# Patient Record
Sex: MALE | Race: WHITE | NOT HISPANIC OR LATINO
[De-identification: names, ages, dates, MRNs, and addresses within clinical notes are randomized per-mention and may not be internally consistent; named-entity substitution may affect disease eponyms.]

---

## 2017-07-31 ENCOUNTER — LABORATORY RESULT (OUTPATIENT)
Age: 57
End: 2017-07-31

## 2017-07-31 ENCOUNTER — APPOINTMENT (OUTPATIENT)
Dept: FAMILY MEDICINE | Facility: CLINIC | Age: 57
End: 2017-07-31
Payer: COMMERCIAL

## 2017-07-31 VITALS
RESPIRATION RATE: 16 BRPM | HEART RATE: 62 BPM | SYSTOLIC BLOOD PRESSURE: 140 MMHG | BODY MASS INDEX: 28.63 KG/M2 | WEIGHT: 200 LBS | DIASTOLIC BLOOD PRESSURE: 90 MMHG | HEIGHT: 70 IN

## 2017-07-31 DIAGNOSIS — F15.90 OTHER STIMULANT USE, UNSPECIFIED, UNCOMPLICATED: ICD-10-CM

## 2017-07-31 DIAGNOSIS — Z78.9 OTHER SPECIFIED HEALTH STATUS: ICD-10-CM

## 2017-07-31 DIAGNOSIS — Z00.00 ENCOUNTER FOR GENERAL ADULT MEDICAL EXAMINATION W/OUT ABNORMAL FINDINGS: ICD-10-CM

## 2017-07-31 LAB
BILIRUB UR QL STRIP: NORMAL
CLARITY UR: CLEAR
COLLECTION METHOD: NORMAL
GLUCOSE UR-MCNC: NORMAL
HCG UR QL: 0.2 EU/DL
HGB UR QL STRIP.AUTO: NORMAL
KETONES UR-MCNC: NORMAL
LEUKOCYTE ESTERASE UR QL STRIP: NORMAL
NITRITE UR QL STRIP: NORMAL
PH UR STRIP: 5.5
PROT UR STRIP-MCNC: NORMAL
SP GR UR STRIP: 1.02

## 2017-07-31 PROCEDURE — 81003 URINALYSIS AUTO W/O SCOPE: CPT | Mod: QW

## 2017-07-31 PROCEDURE — 90472 IMMUNIZATION ADMIN EACH ADD: CPT

## 2017-07-31 PROCEDURE — 90471 IMMUNIZATION ADMIN: CPT

## 2017-07-31 PROCEDURE — 99386 PREV VISIT NEW AGE 40-64: CPT | Mod: 25

## 2017-07-31 PROCEDURE — 96127 BRIEF EMOTIONAL/BEHAV ASSMT: CPT

## 2017-07-31 PROCEDURE — 90715 TDAP VACCINE 7 YRS/> IM: CPT | Mod: LT

## 2017-07-31 PROCEDURE — 36415 COLL VENOUS BLD VENIPUNCTURE: CPT

## 2017-07-31 PROCEDURE — 90670 PCV13 VACCINE IM: CPT | Mod: RT

## 2017-07-31 PROCEDURE — 93000 ELECTROCARDIOGRAM COMPLETE: CPT

## 2017-07-31 RX ORDER — AZELASTINE HYDROCHLORIDE 137 UG/1
0.1 SPRAY, METERED NASAL
Qty: 30 | Refills: 0 | Status: DISCONTINUED | COMMUNITY
Start: 2017-05-08 | End: 2017-07-31

## 2017-07-31 RX ORDER — DOXYCYCLINE HYCLATE 100 MG/1
100 TABLET ORAL
Qty: 14 | Refills: 0 | Status: DISCONTINUED | COMMUNITY
Start: 2017-06-01 | End: 2017-07-31

## 2017-07-31 RX ORDER — NEOMYCIN AND POLYMYXIN B SULFATES AND HYDROCORTISONE OTIC 10; 3.5; 1 MG/ML; MG/ML; [USP'U]/ML
3.5-10000-1 SUSPENSION AURICULAR (OTIC)
Qty: 10 | Refills: 0 | Status: DISCONTINUED | COMMUNITY
Start: 2017-05-08 | End: 2017-07-31

## 2017-08-29 ENCOUNTER — TRANSCRIPTION ENCOUNTER (OUTPATIENT)
Age: 57
End: 2017-08-29

## 2017-09-19 ENCOUNTER — INPATIENT (INPATIENT)
Facility: HOSPITAL | Age: 57
LOS: 2 days | Discharge: HOME | DRG: 125 | End: 2017-09-22
Attending: FAMILY MEDICINE | Admitting: HOSPITALIST
Payer: COMMERCIAL

## 2017-09-19 VITALS
RESPIRATION RATE: 18 BRPM | WEIGHT: 199.96 LBS | HEART RATE: 57 BPM | DIASTOLIC BLOOD PRESSURE: 89 MMHG | HEIGHT: 70 IN | TEMPERATURE: 99 F | SYSTOLIC BLOOD PRESSURE: 147 MMHG | OXYGEN SATURATION: 100 %

## 2017-09-19 DIAGNOSIS — E03.9 HYPOTHYROIDISM, UNSPECIFIED: ICD-10-CM

## 2017-09-19 DIAGNOSIS — R00.1 BRADYCARDIA, UNSPECIFIED: ICD-10-CM

## 2017-09-19 DIAGNOSIS — Z90.49 ACQUIRED ABSENCE OF OTHER SPECIFIED PARTS OF DIGESTIVE TRACT: Chronic | ICD-10-CM

## 2017-09-19 DIAGNOSIS — H46.9 UNSPECIFIED OPTIC NEURITIS: ICD-10-CM

## 2017-09-19 DIAGNOSIS — H53.8 OTHER VISUAL DISTURBANCES: ICD-10-CM

## 2017-09-19 LAB
ALBUMIN SERPL ELPH-MCNC: 4.7 G/DL — SIGNIFICANT CHANGE UP (ref 3.3–5.2)
ALP SERPL-CCNC: 80 U/L — SIGNIFICANT CHANGE UP (ref 40–120)
ALT FLD-CCNC: 13 U/L — SIGNIFICANT CHANGE UP
ANION GAP SERPL CALC-SCNC: 14 MMOL/L — SIGNIFICANT CHANGE UP (ref 5–17)
APTT BLD: 30.6 SEC — SIGNIFICANT CHANGE UP (ref 27.5–37.4)
AST SERPL-CCNC: 18 U/L — SIGNIFICANT CHANGE UP
BASOPHILS # BLD AUTO: 0 K/UL — SIGNIFICANT CHANGE UP (ref 0–0.2)
BASOPHILS NFR BLD AUTO: 0.5 % — SIGNIFICANT CHANGE UP (ref 0–2)
BILIRUB SERPL-MCNC: 0.8 MG/DL — SIGNIFICANT CHANGE UP (ref 0.4–2)
BUN SERPL-MCNC: 14 MG/DL — SIGNIFICANT CHANGE UP (ref 8–20)
CALCIUM SERPL-MCNC: 9.2 MG/DL — SIGNIFICANT CHANGE UP (ref 8.6–10.2)
CHLORIDE SERPL-SCNC: 104 MMOL/L — SIGNIFICANT CHANGE UP (ref 98–107)
CO2 SERPL-SCNC: 26 MMOL/L — SIGNIFICANT CHANGE UP (ref 22–29)
CREAT SERPL-MCNC: 1.17 MG/DL — SIGNIFICANT CHANGE UP (ref 0.5–1.3)
EOSINOPHIL # BLD AUTO: 0.1 K/UL — SIGNIFICANT CHANGE UP (ref 0–0.5)
EOSINOPHIL NFR BLD AUTO: 1.4 % — SIGNIFICANT CHANGE UP (ref 0–5)
ERYTHROCYTE [SEDIMENTATION RATE] IN BLOOD: 2 MM/HR — SIGNIFICANT CHANGE UP (ref 0–20)
GLUCOSE SERPL-MCNC: 87 MG/DL — SIGNIFICANT CHANGE UP (ref 70–115)
HCT VFR BLD CALC: 42.6 % — SIGNIFICANT CHANGE UP (ref 42–52)
HGB BLD-MCNC: 14.3 G/DL — SIGNIFICANT CHANGE UP (ref 14–18)
INR BLD: 1.04 RATIO — SIGNIFICANT CHANGE UP (ref 0.88–1.16)
LYMPHOCYTES # BLD AUTO: 1.5 K/UL — SIGNIFICANT CHANGE UP (ref 1–4.8)
LYMPHOCYTES # BLD AUTO: 23.3 % — SIGNIFICANT CHANGE UP (ref 20–55)
MCHC RBC-ENTMCNC: 27.4 PG — SIGNIFICANT CHANGE UP (ref 27–31)
MCHC RBC-ENTMCNC: 33.6 G/DL — SIGNIFICANT CHANGE UP (ref 32–36)
MCV RBC AUTO: 81.6 FL — SIGNIFICANT CHANGE UP (ref 80–94)
MONOCYTES # BLD AUTO: 0.6 K/UL — SIGNIFICANT CHANGE UP (ref 0–0.8)
MONOCYTES NFR BLD AUTO: 8.6 % — SIGNIFICANT CHANGE UP (ref 3–10)
NEUTROPHILS # BLD AUTO: 4.3 K/UL — SIGNIFICANT CHANGE UP (ref 1.8–8)
NEUTROPHILS NFR BLD AUTO: 65.9 % — SIGNIFICANT CHANGE UP (ref 37–73)
PLATELET # BLD AUTO: 249 K/UL — SIGNIFICANT CHANGE UP (ref 150–400)
POTASSIUM SERPL-MCNC: 4.1 MMOL/L — SIGNIFICANT CHANGE UP (ref 3.5–5.3)
POTASSIUM SERPL-SCNC: 4.1 MMOL/L — SIGNIFICANT CHANGE UP (ref 3.5–5.3)
PROT SERPL-MCNC: 7.5 G/DL — SIGNIFICANT CHANGE UP (ref 6.6–8.7)
PROTHROM AB SERPL-ACNC: 11.5 SEC — SIGNIFICANT CHANGE UP (ref 9.8–12.7)
RBC # BLD: 5.22 M/UL — SIGNIFICANT CHANGE UP (ref 4.6–6.2)
RBC # FLD: 13.2 % — SIGNIFICANT CHANGE UP (ref 11–15.6)
SODIUM SERPL-SCNC: 144 MMOL/L — SIGNIFICANT CHANGE UP (ref 135–145)
WBC # BLD: 6.5 K/UL — SIGNIFICANT CHANGE UP (ref 4.8–10.8)
WBC # FLD AUTO: 6.5 K/UL — SIGNIFICANT CHANGE UP (ref 4.8–10.8)

## 2017-09-19 PROCEDURE — 99285 EMERGENCY DEPT VISIT HI MDM: CPT

## 2017-09-19 PROCEDURE — 70553 MRI BRAIN STEM W/O & W/DYE: CPT | Mod: 26

## 2017-09-19 PROCEDURE — 99223 1ST HOSP IP/OBS HIGH 75: CPT

## 2017-09-19 PROCEDURE — 70548 MR ANGIOGRAPHY NECK W/DYE: CPT | Mod: 26

## 2017-09-19 RX ORDER — HYDROCORTISONE 20 MG
1000 TABLET ORAL ONCE
Qty: 0 | Refills: 0 | Status: DISCONTINUED | OUTPATIENT
Start: 2017-09-19 | End: 2017-09-19

## 2017-09-19 RX ORDER — ATORVASTATIN CALCIUM 80 MG/1
1 TABLET, FILM COATED ORAL
Qty: 0 | Refills: 0 | COMMUNITY

## 2017-09-19 RX ORDER — ATORVASTATIN CALCIUM 80 MG/1
20 TABLET, FILM COATED ORAL AT BEDTIME
Qty: 0 | Refills: 0 | Status: DISCONTINUED | OUTPATIENT
Start: 2017-09-19 | End: 2017-09-22

## 2017-09-19 RX ORDER — LEVOTHYROXINE SODIUM 125 MCG
1 TABLET ORAL
Qty: 0 | Refills: 0 | COMMUNITY

## 2017-09-19 RX ORDER — LEVOTHYROXINE SODIUM 125 MCG
75 TABLET ORAL DAILY
Qty: 0 | Refills: 0 | Status: DISCONTINUED | OUTPATIENT
Start: 2017-09-19 | End: 2017-09-22

## 2017-09-19 RX ORDER — ASPIRIN/CALCIUM CARB/MAGNESIUM 324 MG
1 TABLET ORAL
Qty: 0 | Refills: 0 | COMMUNITY

## 2017-09-19 RX ADMIN — ATORVASTATIN CALCIUM 20 MILLIGRAM(S): 80 TABLET, FILM COATED ORAL at 22:04

## 2017-09-19 RX ADMIN — Medication 58 MILLIGRAM(S): at 18:53

## 2017-09-19 NOTE — H&P ADULT - PROBLEM SELECTOR PLAN 1
Optic neuritis ? ER physician spoke to neurologist dr. De La O and pt. is given 1 gram of iv solumedrol. pt. will be followed , further solumedrol as per neurologist.  Dr. Angel pcp will follow. MRI , MRA studies to follow.

## 2017-09-19 NOTE — H&P ADULT - HISTORY OF PRESENT ILLNESS
58 y/o male who started having rt. eye blurry vision , peripheral loss of vision but clear in center without pain started about 4 days ago and was seen by ophthalmologist on the day of ER visit who sent him to ER . Pt. was told that his optic nerve was swollen and he will need more imaging. pt. never had these kind of sympt. before. no HA. no vision changes in left eye. no n / v/. no fall, no dizziness. no speech / swallow difficulty. no motor weakness in any parts of his body. no cp. no sob. no recent travel.

## 2017-09-19 NOTE — H&P ADULT - PROBLEM SELECTOR PLAN 2
will continue with his synthroid. check TFT. will continue with his synthroid. check TFT. hepatitis panel to be followed by pcp.

## 2017-09-19 NOTE — ED PROVIDER NOTE - ATTENDING CONTRIBUTION TO CARE
I, Anoop Fu, performed face to face bedside interview with this patient regarding history of present illness, review of symptoms and relevant past medical, social and family history.  I completed an independent physical examination.      Patient with right optic nerve swelling, likely secondary to optic neuritis, discussed with neuro and will see in am and want MRI and lab testing, will admit for further eval.

## 2017-09-19 NOTE — ED ADULT TRIAGE NOTE - CHIEF COMPLAINT QUOTE
'I was sent here from Dr Mccray's office for a swollen Optic nerve to see a neurologist, I had a loss of peripheral vision, I wear contacts but haven't been wearing them to see if there would be an improvement but didn't help. " Pt is has photophobia and is wearing sunglasses.

## 2017-09-19 NOTE — H&P ADULT - PROBLEM SELECTOR PLAN 3
pt's HR on ekg is 46, no cp. no dizziness. not on b.blk, hypothyroidism related ? will check ck/trop, echo and cardiologist  [facility] consult will be requested. pt's HR on ekg is 46, no cp. no dizziness. not on b.blk, hypothyroidism related ? will check ck/trop, echo and repeat ekg in am and will request dr. Angel to follow am ekg and cardiology consult as needed. on monitor HR is around 62.

## 2017-09-19 NOTE — ED PROVIDER NOTE - MEDICAL DECISION MAKING DETAILS
R eye optic neuritis likely due to MS. no e/o globe rupture or acute angle glaucoma or cva or GCA on h&p.  -neuro dr fung consulted & rec 1g steroids -labs -ekg -mri -admit

## 2017-09-19 NOTE — ED STATDOCS - PROGRESS NOTE DETAILS
58 yo M sent to ED from opthalmology Dr. Mccray's c/o loss of peripheral vision from right head. Denies n/v, headache, and weight loss. Pt states he was sent to ED to see a neurologist for swollen optic nerve. PE: Poor peripheral vision out of right eye.

## 2017-09-19 NOTE — H&P ADULT - NEUROLOGICAL DETAILS
sensation intact/deep reflexes intact/pt. has rt. visual field deficit in both upper and outer quadrants.  left visual filed intact./alert and oriented x 3

## 2017-09-19 NOTE — ED PROVIDER NOTE - OBJECTIVE STATEMENT
58yo PMH hypothyroidism, HLD, diverticulitis s/p parital colectomy, contact lens user p/w R eye vision change x 4days. pt has had blurry vision & loss of peripheral vision in R eye. denies any eye pain, ha, neck pain, jaw pain, f/c, ivdu, cp, sob. pt went to see opthalmology dr epstein who was concerned about "swollen nerve" & sent pt for neurology evaluation 58yo PMH hypothyroidism, HLD, diverticulitis s/p parital colectomy, contact lens user p/w R eye vision change x 4days. pt has had blurry vision & loss of peripheral vision in R eye. denies any eye pain, ha, neck pain, jaw pain, f/c, ivdu, cp, sob. pt went to see opthalmology dr epstein [phone] who was concerned about "swollen nerve" & sent pt for neurology evaluation & imaging

## 2017-09-20 ENCOUNTER — TRANSCRIPTION ENCOUNTER (OUTPATIENT)
Age: 57
End: 2017-09-20

## 2017-09-20 DIAGNOSIS — H46.9 UNSPECIFIED OPTIC NEURITIS: ICD-10-CM

## 2017-09-20 DIAGNOSIS — E78.5 HYPERLIPIDEMIA, UNSPECIFIED: ICD-10-CM

## 2017-09-20 LAB
APPEARANCE CSF: CLEAR — SIGNIFICANT CHANGE UP
CHOLEST SERPL-MCNC: 154 MG/DL — SIGNIFICANT CHANGE UP (ref 110–199)
CK SERPL-CCNC: 126 U/L — SIGNIFICANT CHANGE UP (ref 30–200)
CK SERPL-CCNC: 139 U/L — SIGNIFICANT CHANGE UP (ref 30–200)
CK SERPL-CCNC: 145 U/L — SIGNIFICANT CHANGE UP (ref 30–200)
COLOR CSF: SIGNIFICANT CHANGE UP
CRYPTOC AG CSF-ACNC: NEGATIVE — SIGNIFICANT CHANGE UP
GLUCOSE CSF-MCNC: 98 MG/DL — HIGH (ref 40–70)
GRAM STN FLD: SIGNIFICANT CHANGE UP
HDLC SERPL-MCNC: 69 MG/DL — SIGNIFICANT CHANGE UP
LIPID PNL WITH DIRECT LDL SERPL: 77 MG/DL — SIGNIFICANT CHANGE UP
NEUTROPHILS # CSF: 0 % — SIGNIFICANT CHANGE UP
NRBC NFR CSF: 3 /UL — SIGNIFICANT CHANGE UP (ref 0–5)
PROT CSF-MCNC: 62 MG/DL — HIGH (ref 15–45)
RBC # CSF: 1 /CMM — SIGNIFICANT CHANGE UP (ref 0–1)
RPR SERPL-ACNC: SIGNIFICANT CHANGE UP
SPECIMEN SOURCE: SIGNIFICANT CHANGE UP
T3 SERPL-MCNC: 92 NG/DL — SIGNIFICANT CHANGE UP (ref 80–200)
T4 AB SER-ACNC: 7.2 UG/DL — SIGNIFICANT CHANGE UP (ref 4.5–12)
TOTAL CHOLESTEROL/HDL RATIO MEASUREMENT: 2 RATIO — LOW (ref 3.4–9.6)
TRIGL SERPL-MCNC: 39 MG/DL — SIGNIFICANT CHANGE UP (ref 10–200)
TROPONIN T SERPL-MCNC: <0.01 NG/ML — SIGNIFICANT CHANGE UP (ref 0–0.06)
TSH SERPL-MCNC: 0.88 UIU/ML — SIGNIFICANT CHANGE UP (ref 0.27–4.2)
TUBE TYPE: SIGNIFICANT CHANGE UP
VIT B12 SERPL-MCNC: 403 PG/ML — SIGNIFICANT CHANGE UP (ref 180–914)

## 2017-09-20 PROCEDURE — 62270 DX LMBR SPI PNXR: CPT

## 2017-09-20 PROCEDURE — 99233 SBSQ HOSP IP/OBS HIGH 50: CPT

## 2017-09-20 PROCEDURE — 93010 ELECTROCARDIOGRAM REPORT: CPT

## 2017-09-20 PROCEDURE — 93306 TTE W/DOPPLER COMPLETE: CPT | Mod: 26

## 2017-09-20 PROCEDURE — 77003 FLUOROGUIDE FOR SPINE INJECT: CPT | Mod: 26

## 2017-09-20 RX ORDER — PANTOPRAZOLE SODIUM 20 MG/1
40 TABLET, DELAYED RELEASE ORAL
Qty: 0 | Refills: 0 | Status: DISCONTINUED | OUTPATIENT
Start: 2017-09-20 | End: 2017-09-22

## 2017-09-20 RX ORDER — ENOXAPARIN SODIUM 100 MG/ML
40 INJECTION SUBCUTANEOUS DAILY
Qty: 0 | Refills: 0 | Status: DISCONTINUED | OUTPATIENT
Start: 2017-09-20 | End: 2017-09-22

## 2017-09-20 RX ORDER — ASPIRIN/CALCIUM CARB/MAGNESIUM 324 MG
81 TABLET ORAL DAILY
Qty: 0 | Refills: 0 | Status: DISCONTINUED | OUTPATIENT
Start: 2017-09-20 | End: 2017-09-22

## 2017-09-20 RX ORDER — ZOLPIDEM TARTRATE 10 MG/1
5 TABLET ORAL ONCE
Qty: 0 | Refills: 0 | Status: DISCONTINUED | OUTPATIENT
Start: 2017-09-20 | End: 2017-09-20

## 2017-09-20 RX ADMIN — ATORVASTATIN CALCIUM 20 MILLIGRAM(S): 80 TABLET, FILM COATED ORAL at 22:16

## 2017-09-20 RX ADMIN — Medication 58 MILLIGRAM(S): at 13:51

## 2017-09-20 RX ADMIN — PANTOPRAZOLE SODIUM 40 MILLIGRAM(S): 20 TABLET, DELAYED RELEASE ORAL at 13:33

## 2017-09-20 RX ADMIN — ZOLPIDEM TARTRATE 5 MILLIGRAM(S): 10 TABLET ORAL at 22:16

## 2017-09-20 RX ADMIN — Medication 81 MILLIGRAM(S): at 13:33

## 2017-09-20 RX ADMIN — Medication 75 MICROGRAM(S): at 05:34

## 2017-09-20 NOTE — DISCHARGE NOTE ADULT - MEDICATION SUMMARY - MEDICATIONS TO TAKE
I will START or STAY ON the medications listed below when I get home from the hospital:    predniSONE 10 mg oral tablet  -- 4 tab(s) by mouth once a day for 4 days  then 3 tabs daily x 4 days  then 2 tabs daily x 4 days   then 1 tab daily x 4 days  -- It is very important that you take or use this exactly as directed.  Do not skip doses or discontinue unless directed by your doctor.  Obtain medical advice before taking any non-prescription drugs as some may affect the action of this medication.  Take with food or milk.    -- Indication: For Optic neuritis    aspirin 81 mg oral tablet  -- 1 tab(s) by mouth once a day  -- Indication: For CAD    Lipitor 20 mg oral tablet  -- 1 tab(s) by mouth once a day  -- Indication: For Hyperlipemia    Synthroid 75 mcg (0.075 mg) oral tablet  -- 1 tab(s) by mouth once a day  -- Indication: For Hypothyroid

## 2017-09-20 NOTE — ED ADULT NURSE REASSESSMENT NOTE - NS ED NURSE REASSESS COMMENT FT1
Pt. returned from MRI in NAD.
pt. transported to MRI in [facility]. will continue to monitor.
took report from zain gillespie , pt not in ed at this time.

## 2017-09-20 NOTE — DISCHARGE NOTE ADULT - HOSPITAL COURSE
· Chief Complaint: The patient is a 57y Male complaining of eye vision change.	  · HPI Objective Statement: 58yo PMH hypothyroidism, HLD, diverticulitis s/p parital colectomy, contact lens user p/w R eye vision change x 4days. pt has had blurry vision & loss of peripheral vision in R eye. denies any eye pain, ha, neck pain, jaw pain, f/c, ivdu, cp, sob. pt went to see opthalmology dr epstein [phone] who was concerned about "swollen nerve" & sent pt for neurology evaluation & imaging · Chief Complaint: The patient is a 57y Male complaining of eye vision change. 	  · HPI Objective Statement: 56yo PMH hypothyroidism, HLD, diverticulitis s/p parital colectomy, contact lens user p/w R eye vision change x 4days. pt has had blurry vision & loss of peripheral vision in R eye. denies any eye pain, ha, neck pain, jaw pain, f/c, ivdu, cp, sob. pt went to see opthalmology dr epstein [phone] who was concerned about "swollen nerve" & sent pt for neurology evaluation & imaging. MRI/MRA/MRV = all tests negative ECHO = normal  LP done  pt d/c home with prednisone taper

## 2017-09-20 NOTE — DISCHARGE NOTE ADULT - CARE PROVIDER_API CALL
[place],   Phone: (   )    -  Fax: (   )    - Ahsan Wagoner), Neurology  [address] [place] [address]  Phone: [phone]  Fax: [phone]    Naomy Angel), Family Medicine  [address]  [place] [address]  Phone: [phone]  Fax: [phone] Ahsan Wagoner), Neurology  [address] [place] [address]  Phone: [phone]  Fax: [phone]    Naomy Angel), Family Medicine  [address]  [place] [address]  Phone: [phone]  Fax: [phone]    Ramirez Lay), Medicine Pediatrics  [address] [place] [address]  Phone: [phone]  Fax: [phone]

## 2017-09-20 NOTE — CONSULT NOTE ADULT - SUBJECTIVE AND OBJECTIVE BOX
HPI: 56yo RH male who started having rt. eye blurry vision , mostly affecting on the periphery from R eye but clear in center without pain started about 4 days ago and was seen by ophthalmologist (Dr Mccray) on the day of ER visit who sent him to ER . Pt. was told that his optic nerve was swollen and he will need more imaging.  Denies having similar symptoms in past.  Denies any headache,  vision disturbance in left eye, N/V, falls, head injuries, dizziness, Speech difficulty. Denies any motor/sensory changes.  No recent travel or fevers.      PAST MEDICAL & SURGICAL HISTORY:  Hyperlipemia  Hypothyroid  History of colon resection: due to diverticulitis.  History of appendectomy    MEDICATIONS  (STANDING):  levothyroxine 75 MICROGram(s) Oral daily  atorvastatin 20 milliGRAM(s) Oral at bedtime  aspirin enteric coated 81 milliGRAM(s) Oral daily  enoxaparin Injectable 40 milliGRAM(s) SubCutaneous daily    MEDICATIONS  (PRN):    Allergies    No Known Allergies    Intolerances        FAMILY HISTORY:  No pertinent family history in first degree relatives          SOCIAL HISTORY:  Denies toxic habits;     REVIEW OF SYSTEMS:    As noted in the HPI.    VITAL SIGNS:  Vital Signs Last 24 Hrs  T(C): 36.9 (20 Sep 2017 08:39), Max: 37.1 (19 Sep 2017 13:46)  T(F): 98.4 (20 Sep 2017 08:39), Max: 98.7 (19 Sep 2017 13:46)  HR: 80 (20 Sep 2017 08:39) (57 - 80)  BP: 139/88 (20 Sep 2017 08:39) (129/84 - 147/89)  BP(mean): --  RR: 30 (20 Sep 2017 08:39) (18 - 30)  SpO2: 95% (20 Sep 2017 08:39) (95% - 100%)    PHYSICAL EXAMINATION:  General: Well-developed, well nourished, in no acute distress.  Eyes: Conjunctiva and sclera clear. Fundoscopic examination was deferred.  Neck: Supple.  Cardiac: +S1 & S2; Regular.  Chest: CTA b/l.    Musculoskeletal: No tenderness on palpation of spine.  No Brudzinski/Kernig's sign.    Neurologic:  - Mental Status:  Alert, awake, oriented to person, place, and time; Speech is fluent with intact naming, repetition, and comprehension  Cranial Nerves II-XII:    II:  Visual acuity is normal for age ; Visual fields are full to confrontation; Pupils are equal, round, and reactive to light.  III, IV, VI:  Extraocular movements are intact without nystagmus.  V:  Facial sensation is intact in the V1-V3 distribution bilaterally.  VII:  Face is symmetric with normal eye closure and smile  VIII:  Hearing is intact and symmetric for age  IX, X:  Uvula is midline and soft palate rises symmetrically  XI:  Head turning and shoulder shrug are intact.  XII:  Tongue protrudes in the midline.  - Motor:  Strength is 5/5 throughout.  There is no pronator drift.  Normal muscle bulk and tone throughout.  - Reflexes:  2+ and symmetric throughout.  - Sensory:  Intact and symmetric to light touch, and joint-position sense.  - Coordination:  No dysmetria/dysdiadochokinesis.   - Gait: Deferred.      LABS:                          14.3   6.5   )-----------( 249      ( 19 Sep 2017 17:18 )             42.6     19 Sep 2017 17:18    144    |  104    |  14.0   ----------------------------<  87     4.1     |  26.0   |  1.17     Ca    9.2        19 Sep 2017 17:18    TPro  7.5    /  Alb  4.7    /  TBili  0.8    /  DBili  x      /  AST  18     /  ALT  13     /  AlkPhos  80     19 Sep 2017 17:18    LIVER FUNCTIONS - ( 19 Sep 2017 17:18 )  Alb: 4.7 g/dL / Pro: 7.5 g/dL / ALK PHOS: 80 U/L / ALT: 13 U/L / AST: 18 U/L / GGT: x           ESR 2    PT/INR - ( 19 Sep 2017 17:18 )   PT: 11.5 sec;   INR: 1.04 ratio         PTT - ( 19 Sep 2017 17:18 )  PTT:30.6 sec      RADIOLOGY & ADDITIONAL STUDIES:      MRI Head/orbits w/wo Cont (09.19.17 @ 21:29) >  Normal brain    MRA Neck w/Cont (09.19.17 @ 21:29) >  Normal study          IMPRESSION:  R sided visual disturbance with possible optic neuritis

## 2017-09-20 NOTE — DISCHARGE NOTE ADULT - CARE PLAN
Principal Discharge DX:	Blurry vision, right eye  Goal:	f/u with neurology  Instructions for follow-up, activity and diet:	regular diet  Secondary Diagnosis:	Optic neuritis  Secondary Diagnosis:	Visual changes  Secondary Diagnosis:	Hyperlipemia  Secondary Diagnosis:	Hypothyroidism, unspecified type

## 2017-09-20 NOTE — DISCHARGE NOTE ADULT - CARE PROVIDERS DIRECT ADDRESSES
,DirectAddress_Unknown ,DirectAddress_Unknown,[email or web address] ,DirectAddress_Unknown,[email or web address],DirectAddress_Unknown

## 2017-09-20 NOTE — DISCHARGE NOTE ADULT - PROVIDER TOKENS
FREE:[LAST:[PMD],PHONE:[(   )    -],FAX:[(   )    -]] TOKEN:'[id number]',TOKEN:'[id number]' TOKEN:'[id number]',TOKEN:'[id number]',TOKEN:'[id number]'

## 2017-09-20 NOTE — DISCHARGE NOTE ADULT - ADDITIONAL INSTRUCTIONS
Outpatient follow up at [facility] at [phone] or [phone] with MS clinic for further diagnostic and treatment regimen.

## 2017-09-20 NOTE — DISCHARGE NOTE ADULT - PATIENT PORTAL LINK FT
“You can access the [facility] Patient Portal, offered by [facility], by registering with the following website: [email or web address]”

## 2017-09-21 LAB
ACE SERPL-CCNC: 35 U/L — SIGNIFICANT CHANGE UP (ref 14–82)
ALBUMIN CSF-MCNC: 44.5 MG/DL — HIGH (ref 14–25)
ALBUMIN SERPL ELPH-MCNC: 4240 MG/DL — SIGNIFICANT CHANGE UP (ref 3500–5200)
B BURGDOR C6 AB SER-ACNC: NEGATIVE — SIGNIFICANT CHANGE UP
B BURGDOR IGG+IGM SER-ACNC: 0.08 INDEX — SIGNIFICANT CHANGE UP (ref 0.01–0.89)
C-ANCA SER-ACNC: NEGATIVE — SIGNIFICANT CHANGE UP
DSDNA AB FLD-ACNC: <0.2 AI — SIGNIFICANT CHANGE UP
ENA SS-A AB FLD IA-ACNC: <0.2 AI — SIGNIFICANT CHANGE UP
IGG CSF-MCNC: 4 MG/DL — HIGH
IGG FLD-MCNC: 1050 MG/DL — SIGNIFICANT CHANGE UP (ref 694–1618)
IGG SYNTH RATE SER+CSF CALC-MRATE: -8.1 MG/DAY — SIGNIFICANT CHANGE UP
IGG/ALB CLEAR SER+CSF-RTO: 0.4 — SIGNIFICANT CHANGE UP
IGG/ALB CSF: 0.09 RATIO — SIGNIFICANT CHANGE UP
IGG/ALB SER: 0.25 RATIO — SIGNIFICANT CHANGE UP
NIGHT BLUE STAIN TISS: SIGNIFICANT CHANGE UP
OLIGOCLONAL BANDS CSF ELPH-IMP: SIGNIFICANT CHANGE UP
P-ANCA SER-ACNC: NEGATIVE — SIGNIFICANT CHANGE UP
SPECIMEN SOURCE: SIGNIFICANT CHANGE UP

## 2017-09-21 PROCEDURE — 70544 MR ANGIOGRAPHY HEAD W/O DYE: CPT | Mod: 26,76

## 2017-09-21 PROCEDURE — 99233 SBSQ HOSP IP/OBS HIGH 50: CPT

## 2017-09-21 RX ORDER — INFLUENZA VIRUS VACCINE 15; 15; 15; 15 UG/.5ML; UG/.5ML; UG/.5ML; UG/.5ML
0.5 SUSPENSION INTRAMUSCULAR ONCE
Qty: 0 | Refills: 0 | Status: DISCONTINUED | OUTPATIENT
Start: 2017-09-21 | End: 2017-09-22

## 2017-09-21 RX ORDER — ZOLPIDEM TARTRATE 10 MG/1
5 TABLET ORAL AT BEDTIME
Qty: 0 | Refills: 0 | Status: DISCONTINUED | OUTPATIENT
Start: 2017-09-21 | End: 2017-09-22

## 2017-09-21 RX ADMIN — ENOXAPARIN SODIUM 40 MILLIGRAM(S): 100 INJECTION SUBCUTANEOUS at 12:47

## 2017-09-21 RX ADMIN — Medication 81 MILLIGRAM(S): at 12:47

## 2017-09-21 RX ADMIN — ATORVASTATIN CALCIUM 20 MILLIGRAM(S): 80 TABLET, FILM COATED ORAL at 21:59

## 2017-09-21 RX ADMIN — ZOLPIDEM TARTRATE 5 MILLIGRAM(S): 10 TABLET ORAL at 21:59

## 2017-09-21 RX ADMIN — PANTOPRAZOLE SODIUM 40 MILLIGRAM(S): 20 TABLET, DELAYED RELEASE ORAL at 06:04

## 2017-09-21 RX ADMIN — Medication 75 MICROGRAM(S): at 06:04

## 2017-09-21 RX ADMIN — Medication 58 MILLIGRAM(S): at 06:04

## 2017-09-22 VITALS
HEART RATE: 68 BPM | SYSTOLIC BLOOD PRESSURE: 142 MMHG | TEMPERATURE: 98 F | DIASTOLIC BLOOD PRESSURE: 75 MMHG | RESPIRATION RATE: 18 BRPM

## 2017-09-22 LAB
ACE SERPL-CCNC: 36 U/L — SIGNIFICANT CHANGE UP (ref 14–82)
AQP4 H2O CHANNEL AB SERPL IA-ACNC: NEGATIVE — SIGNIFICANT CHANGE UP
FOLATE RBC-MCNC: 1153 NG/ML — SIGNIFICANT CHANGE UP (ref 499–1504)
HCT VFR BLD CALC: 43 % — SIGNIFICANT CHANGE UP (ref 39–50)

## 2017-09-22 PROCEDURE — 84157 ASSAY OF PROTEIN OTHER: CPT

## 2017-09-22 PROCEDURE — 86618 LYME DISEASE ANTIBODY: CPT

## 2017-09-22 PROCEDURE — 84436 ASSAY OF TOTAL THYROXINE: CPT

## 2017-09-22 PROCEDURE — 84480 ASSAY TRIIODOTHYRONINE (T3): CPT

## 2017-09-22 PROCEDURE — 99285 EMERGENCY DEPT VISIT HI MDM: CPT | Mod: 25

## 2017-09-22 PROCEDURE — 86235 NUCLEAR ANTIGEN ANTIBODY: CPT

## 2017-09-22 PROCEDURE — 89051 BODY FLUID CELL COUNT: CPT

## 2017-09-22 PROCEDURE — 86592 SYPHILIS TEST NON-TREP QUAL: CPT

## 2017-09-22 PROCEDURE — 77003 FLUOROGUIDE FOR SPINE INJECT: CPT

## 2017-09-22 PROCEDURE — 85652 RBC SED RATE AUTOMATED: CPT

## 2017-09-22 PROCEDURE — 83873 ASSAY OF CSF PROTEIN: CPT

## 2017-09-22 PROCEDURE — 86147 CARDIOLIPIN ANTIBODY EA IG: CPT

## 2017-09-22 PROCEDURE — 82550 ASSAY OF CK (CPK): CPT

## 2017-09-22 PROCEDURE — 84443 ASSAY THYROID STIM HORMONE: CPT

## 2017-09-22 PROCEDURE — 80053 COMPREHEN METABOLIC PANEL: CPT

## 2017-09-22 PROCEDURE — 87476 LYME DIS DNA AMP PROBE: CPT

## 2017-09-22 PROCEDURE — 36415 COLL VENOUS BLD VENIPUNCTURE: CPT

## 2017-09-22 PROCEDURE — 80061 LIPID PANEL: CPT

## 2017-09-22 PROCEDURE — 85610 PROTHROMBIN TIME: CPT

## 2017-09-22 PROCEDURE — 84484 ASSAY OF TROPONIN QUANT: CPT

## 2017-09-22 PROCEDURE — 86255 FLUORESCENT ANTIBODY SCREEN: CPT

## 2017-09-22 PROCEDURE — 86403 PARTICLE AGGLUT ANTBDY SCRN: CPT

## 2017-09-22 PROCEDURE — 82945 GLUCOSE OTHER FLUID: CPT

## 2017-09-22 PROCEDURE — 87116 MYCOBACTERIA CULTURE: CPT

## 2017-09-22 PROCEDURE — 82164 ANGIOTENSIN I ENZYME TEST: CPT

## 2017-09-22 PROCEDURE — 86617 LYME DISEASE ANTIBODY: CPT

## 2017-09-22 PROCEDURE — 85027 COMPLETE CBC AUTOMATED: CPT

## 2017-09-22 PROCEDURE — 82747 ASSAY OF FOLIC ACID RBC: CPT

## 2017-09-22 PROCEDURE — 70544 MR ANGIOGRAPHY HEAD W/O DYE: CPT

## 2017-09-22 PROCEDURE — 86036 ANCA SCREEN EACH ANTIBODY: CPT

## 2017-09-22 PROCEDURE — 93005 ELECTROCARDIOGRAM TRACING: CPT

## 2017-09-22 PROCEDURE — 87205 SMEAR GRAM STAIN: CPT

## 2017-09-22 PROCEDURE — 85730 THROMBOPLASTIN TIME PARTIAL: CPT

## 2017-09-22 PROCEDURE — 70548 MR ANGIOGRAPHY NECK W/DYE: CPT

## 2017-09-22 PROCEDURE — 87070 CULTURE OTHR SPECIMN AEROBIC: CPT

## 2017-09-22 PROCEDURE — 82607 VITAMIN B-12: CPT

## 2017-09-22 PROCEDURE — G0378: CPT

## 2017-09-22 PROCEDURE — 93306 TTE W/DOPPLER COMPLETE: CPT

## 2017-09-22 PROCEDURE — 70553 MRI BRAIN STEM W/O & W/DYE: CPT

## 2017-09-22 PROCEDURE — 87102 FUNGUS ISOLATION CULTURE: CPT

## 2017-09-22 PROCEDURE — 96374 THER/PROPH/DIAG INJ IV PUSH: CPT

## 2017-09-22 PROCEDURE — 99239 HOSP IP/OBS DSCHRG MGMT >30: CPT

## 2017-09-22 RX ADMIN — Medication 81 MILLIGRAM(S): at 11:46

## 2017-09-22 RX ADMIN — Medication 58 MILLIGRAM(S): at 05:57

## 2017-09-22 RX ADMIN — Medication 75 MICROGRAM(S): at 05:57

## 2017-09-22 RX ADMIN — PANTOPRAZOLE SODIUM 40 MILLIGRAM(S): 20 TABLET, DELAYED RELEASE ORAL at 05:57

## 2017-09-22 NOTE — PROGRESS NOTE ADULT - ASSESSMENT
on solumedrol  neuro called and seen   visual loss not improved
on solumedrol  neuro called and seen   visual loss not improved   MRA/V done today   LP done
IV solumedrol completed   neuro called and seen   visual loss not improved

## 2017-09-22 NOTE — PROGRESS NOTE ADULT - SUBJECTIVE AND OBJECTIVE BOX
INTERVAL HISTORY:  Seen at bedside; comfortable; no vision changes noted.    No Known Allergies      VITAL SIGNS:  Vital Signs Last 24 Hrs  T(C): 36.6 (21 Sep 2017 10:11), Max: 36.7 (20 Sep 2017 18:20)  T(F): 97.8 (21 Sep 2017 10:11), Max: 98 (20 Sep 2017 18:20)  HR: 100 (21 Sep 2017 10:11) (78 - 100)  BP: 145/80 (21 Sep 2017 10:11) (110/70 - 145/80)  BP(mean): --  RR: 18 (21 Sep 2017 10:11) (15 - 18)  SpO2: 98% (21 Sep 2017 08:22) (95% - 98%)    PHYSICAL EXAMINATION:  General: Well-developed, well nourished, in no acute distress.  Eyes: Conjunctiva and sclera clear.  Neurologic:  - Mental Status:  Alert, awake, oriented to person, place, and time; Speech is normal; Affect is normal.  - Cranial Nerves: II-XI intact.  - Motor:  Strength is 5/5 throughout.  There is no pronator drift.  Normal muscle bulk and tone throughout.  - Reflexes:  2+ throughout  - Sensory:  Intact to light touch, pin prick, vibration, and joint-position sense throughout.  - Coordination:  No dysmetria/dysdiadochokinesis.    MEDS:  MEDICATIONS  (STANDING):  levothyroxine 75 MICROGram(s) Oral daily  atorvastatin 20 milliGRAM(s) Oral at bedtime  aspirin enteric coated 81 milliGRAM(s) Oral daily  enoxaparin Injectable 40 milliGRAM(s) SubCutaneous daily  methylPREDNISolone sodium succinate IVPB 1000 milliGRAM(s) IV Intermittent daily  pantoprazole    Tablet 40 milliGRAM(s) Oral before breakfast  influenza   Vaccine 0.5 milliLiter(s) IntraMuscular once    MEDICATIONS  (PRN):      LABS:                          14.3   6.5   )-----------( 249      ( 19 Sep 2017 17:18 )             42.6     09-19    144  |  104  |  14.0  ----------------------------<  87  4.1   |  26.0  |  1.17    Ca    9.2      19 Sep 2017 17:18    TPro  x   /  Alb  4240  /  TBili  x   /  DBili  x   /  AST  x   /  ALT  x   /  AlkPhos  x   09-20    LIVER FUNCTIONS - ( 20 Sep 2017 20:18 )  Alb: 4240 mg/dL / Pro: x     / ALK PHOS: x     / ALT: x     / AST: x     / GGT: x    Lyme negative.  Anti SSA/SSB - normal.         CSF Color: No Color (09-20-17 @ 16:35)  CSF Appearance: Clear (09-20-17 @ 16:35)  CSF Segmented Neutrophils: 0 % (09-20-17 @ 16:35)  Glucose, CSF: 98 mg/dL (09-20-17 @ 16:34)  Protein, CSF: 62 mg/dL (09-20-17 @ 16:34)  CSF IGG - 4.0  CSF RBC - 1  CSF PMN - 3  CSF Cryptococcal ag - neg      RADIOLOGY & ADDITIONAL STUDIES:      MRA head without jackie 9/20/17 - normal study.    MRV Brain without jackie 9/20/17 - Normal study    IMPRESSION:  R vision dysfunction with concerns for Optic neuritis.
INTERVAL HISTORY:  Seen at bedside; no changes with vision.  No new changes overnight    No Known Allergies      VITAL SIGNS:  Vital Signs Last 24 Hrs  T(C): 36.5 (22 Sep 2017 09:58), Max: 36.6 (21 Sep 2017 15:45)  T(F): 97.7 (22 Sep 2017 09:58), Max: 97.9 (21 Sep 2017 15:45)  HR: 68 (22 Sep 2017 09:58) (58 - 98)  BP: 142/75 (22 Sep 2017 09:58) (110/78 - 142/75)  BP(mean): --  RR: 18 (22 Sep 2017 09:58) (16 - 18)  SpO2: 96% (22 Sep 2017 05:55) (95% - 97%)    PHYSICAL EXAMINATION:  General: Well-developed, well nourished, in no acute distress.  Eyes: Conjunctiva and sclera clear.  Neurologic:  - Mental Status:  Alert, awake, oriented to person, place, and time; Speech is normal; Affect is normal.  - Cranial Nerves: pupil 3mm b/l reactive; + EOMI, + Corneal.  -VFD.  - Motor:  Strength is 5/5 throughout.  There is no pronator drift.  Normal muscle bulk and tone throughout.  - Reflexes:  2+ throughout  - Sensory:  Intact to light touch, pin prick, vibration, and joint-position sense throughout.  - Coordination:  No dysmetria/dysdiadochokinesis.    MEDS:  MEDICATIONS  (STANDING):  levothyroxine 75 MICROGram(s) Oral daily  atorvastatin 20 milliGRAM(s) Oral at bedtime  aspirin enteric coated 81 milliGRAM(s) Oral daily  enoxaparin Injectable 40 milliGRAM(s) SubCutaneous daily  pantoprazole    Tablet 40 milliGRAM(s) Oral before breakfast  influenza   Vaccine 0.5 milliLiter(s) IntraMuscular once    MEDICATIONS  (PRN):  zolpidem 5 milliGRAM(s) Oral at bedtime PRN Insomnia      LABS:                          x      x     )-----------( x        ( 21 Sep 2017 18:55 )             43           TPro  x   /  Alb  4240  /  TBili  x   /  DBili  x   /  AST  x   /  ALT  x   /  AlkPhos  x   09-20    LIVER FUNCTIONS - ( 20 Sep 2017 20:18 )  Alb: 4240 mg/dL / Pro: x     / ALK PHOS: x     / ALT: x     / AST: x     / GGT: x         Folate, RBC: 1153 ng/mL (09.21.17 @ 18:55)    Angiotensin Converting Enzyme, Serum: 35: Performed At: RN [facility] [address] [id number]  Reyes Araceli B MD Ph:[phone] U/L (09.20.17 @ 23:11)          IMPRESSION:  Visual changes with concerns for Optic neuritis.
Patient is a 57y old  Male who presents with a chief complaint of blurry vision rt. (20 Sep 2017 10:06)    + optic neuritis noted at ophthalmologist     HEMATOLOGIC:  aspirin enteric coated 81 milliGRAM(s) Oral daily  enoxaparin Injectable 40 milliGRAM(s) SubCutaneous daily    ENDO/METABOLIC:  levothyroxine 75 MICROGram(s) Oral daily  atorvastatin 20 milliGRAM(s) Oral at bedtime        Allergies    No Known Allergies          Vital Signs Last 24 Hrs  T(C): 36.5 (21 Sep 2017 06:00), Max: 36.7 (20 Sep 2017 18:20)  T(F): 97.7 (21 Sep 2017 06:00), Max: 98 (20 Sep 2017 18:20)  HR: 85 (21 Sep 2017 08:22) (78 - 85)  BP: 122/84 (21 Sep 2017 08:22) (110/70 - 132/70)  BP(mean): --  RR: 18 (21 Sep 2017 08:22) (15 - 18)  SpO2: 98% (21 Sep 2017 08:22) (95% - 98%)          REVIEW OF SYSTEMS:    CONSTITUTIONAL: No fever, weight loss, or fatigue  EYES: No eye pain, visual disturbances, or discharge  ENMT:  No difficulty hearing, tinnitus, vertigo; No sinus or throat pain  NECK: No pain or stiffness  RESPIRATORY: No cough, wheezing, chills or hemoptysis; No shortness of breath  CARDIOVASCULAR: No chest pain, palpitations, dizziness, or leg swelling  GASTROINTESTINAL: No abdominal or epigastric pain. No nausea, vomiting, or hematemesis; No diarrhea or constipation. No melena or hematochezia.  GENITOURINARY: No dysuria, frequency, hematuria, or incontinence  NEUROLOGICAL: No headaches, memory loss, loss of strength, numbness, or tremors, loss of R peripheral vision   SKIN: No itching, burning, rashes, or lesions   LYMPH NODES: No enlarged glands  ENDOCRINE: No heat or cold intolerance; No hair loss  MUSCULOSKELETAL: No joint pain or swelling; No muscle, back, or extremity pain        PHYSICAL EXAM:    GENERAL: NAD, well-groomed, well-developed  HEAD:  Atraumatic, Normocephalic  EYES: EOMI, PERRLA, conjunctiva and sclera clear  ENMT: No tonsillar erythema, exudates, or enlargement; Moist mucous membranes, Good dentition, No lesions  NECK: Supple, No JVD, Normal thyroid  NERVOUS SYSTEM:  Alert & Oriented X3, Good concentration; Motor Strength 5/5 B/L upper and lower extremities; DTRs 2+ intact and symmetric, +loss of R peripheral vision   CHEST/LUNG: Clear to percussion bilaterally; No rales, rhonchi, wheezing, or rubs  HEART: Regular rate and rhythm; No murmurs, rubs, or gallops  ABDOMEN: Soft, Nontender, Nondistended; Bowel sounds present  EXTREMITIES:  2+ Peripheral Pulses, No clubbing, cyanosis, or edema  LYMPH: No lymphadenopathy noted        LABS:                        14.3   6.5   )-----------( 249      ( 19 Sep 2017 17:18 )             42.6     CBC Full  -  ( 19 Sep 2017 17:18 )  WBC Count : 6.5 K/uL  Hemoglobin : 14.3 g/dL  Hematocrit : 42.6 %  Platelet Count - Automated : 249 K/uL  Mean Cell Volume : 81.6 fl  Mean Cell Hemoglobin : 27.4 pg  Mean Cell Hemoglobin Concentration : 33.6 g/dL  Auto Neutrophil # : 4.3 K/uL  Auto Lymphocyte # : 1.5 K/uL  Auto Monocyte # : 0.6 K/uL  Auto Eosinophil # : 0.1 K/uL  Auto Basophil # : 0.0 K/uL  Auto Neutrophil % : 65.9 %  Auto Lymphocyte % : 23.3 %  Auto Monocyte % : 8.6 %  Auto Eosinophil % : 1.4 %  Auto Basophil % : 0.5 %    09-19    144  |  104  |  14.0  ----------------------------<  87  4.1   |  26.0  |  1.17    Ca    9.2      19 Sep 2017 17:18    TPro  7.5  /  Alb  4.7  /  TBili  0.8  /  DBili  x   /  AST  18  /  ALT  13  /  AlkPhos  80  09-19    PT/INR - ( 19 Sep 2017 17:18 )   PT: 11.5 sec;   INR: 1.04 ratio         PTT - ( 19 Sep 2017 17:18 )  PTT:30.6 sec       MRI Head w/wo Cont (09.19.17 @ 21:29) >  IMPRESSION:    Normal brain         MRA Neck w/Cont (09.19.17 @ 21:29) >  IMPRESSION:    Normal study
Patient is a 57y old  Male who presents with a chief complaint of blurry vision rt. (20 Sep 2017 10:06)    + optic neuritis noted at ophthalmologist     HEMATOLOGIC:  aspirin enteric coated 81 milliGRAM(s) Oral daily  enoxaparin Injectable 40 milliGRAM(s) SubCutaneous daily    ENDO/METABOLIC:  levothyroxine 75 MICROGram(s) Oral daily  atorvastatin 20 milliGRAM(s) Oral at bedtime        Allergies    No Known Allergies          Vital Signs Last 24 Hrs  T(C): 36.9 (20 Sep 2017 08:39), Max: 37.1 (19 Sep 2017 13:46)  T(F): 98.4 (20 Sep 2017 08:39), Max: 98.7 (19 Sep 2017 13:46)  HR: 80 (20 Sep 2017 08:39) (57 - 80)  BP: 139/88 (20 Sep 2017 08:39) (129/84 - 147/89)  BP(mean): --  RR: 30 (20 Sep 2017 08:39) (18 - 30)  SpO2: 95% (20 Sep 2017 08:39) (95% - 100%)          REVIEW OF SYSTEMS:    CONSTITUTIONAL: No fever, weight loss, or fatigue  EYES: No eye pain, visual disturbances, or discharge  ENMT:  No difficulty hearing, tinnitus, vertigo; No sinus or throat pain  NECK: No pain or stiffness  RESPIRATORY: No cough, wheezing, chills or hemoptysis; No shortness of breath  CARDIOVASCULAR: No chest pain, palpitations, dizziness, or leg swelling  GASTROINTESTINAL: No abdominal or epigastric pain. No nausea, vomiting, or hematemesis; No diarrhea or constipation. No melena or hematochezia.  GENITOURINARY: No dysuria, frequency, hematuria, or incontinence  NEUROLOGICAL: No headaches, memory loss, loss of strength, numbness, or tremors, loss of R peripheral vision   SKIN: No itching, burning, rashes, or lesions   LYMPH NODES: No enlarged glands  ENDOCRINE: No heat or cold intolerance; No hair loss  MUSCULOSKELETAL: No joint pain or swelling; No muscle, back, or extremity pain        PHYSICAL EXAM:    GENERAL: NAD, well-groomed, well-developed  HEAD:  Atraumatic, Normocephalic  EYES: EOMI, PERRLA, conjunctiva and sclera clear  ENMT: No tonsillar erythema, exudates, or enlargement; Moist mucous membranes, Good dentition, No lesions  NECK: Supple, No JVD, Normal thyroid  NERVOUS SYSTEM:  Alert & Oriented X3, Good concentration; Motor Strength 5/5 B/L upper and lower extremities; DTRs 2+ intact and symmetric, +loss of R peripheral vision   CHEST/LUNG: Clear to percussion bilaterally; No rales, rhonchi, wheezing, or rubs  HEART: Regular rate and rhythm; No murmurs, rubs, or gallops  ABDOMEN: Soft, Nontender, Nondistended; Bowel sounds present  EXTREMITIES:  2+ Peripheral Pulses, No clubbing, cyanosis, or edema  LYMPH: No lymphadenopathy noted        LABS:                        14.3   6.5   )-----------( 249      ( 19 Sep 2017 17:18 )             42.6     CBC Full  -  ( 19 Sep 2017 17:18 )  WBC Count : 6.5 K/uL  Hemoglobin : 14.3 g/dL  Hematocrit : 42.6 %  Platelet Count - Automated : 249 K/uL  Mean Cell Volume : 81.6 fl  Mean Cell Hemoglobin : 27.4 pg  Mean Cell Hemoglobin Concentration : 33.6 g/dL  Auto Neutrophil # : 4.3 K/uL  Auto Lymphocyte # : 1.5 K/uL  Auto Monocyte # : 0.6 K/uL  Auto Eosinophil # : 0.1 K/uL  Auto Basophil # : 0.0 K/uL  Auto Neutrophil % : 65.9 %  Auto Lymphocyte % : 23.3 %  Auto Monocyte % : 8.6 %  Auto Eosinophil % : 1.4 %  Auto Basophil % : 0.5 %    09-19    144  |  104  |  14.0  ----------------------------<  87  4.1   |  26.0  |  1.17    Ca    9.2      19 Sep 2017 17:18    TPro  7.5  /  Alb  4.7  /  TBili  0.8  /  DBili  x   /  AST  18  /  ALT  13  /  AlkPhos  80  09-19    PT/INR - ( 19 Sep 2017 17:18 )   PT: 11.5 sec;   INR: 1.04 ratio         PTT - ( 19 Sep 2017 17:18 )  PTT:30.6 sec       MRI Head w/wo Cont (09.19.17 @ 21:29) >  IMPRESSION:    Normal brain         MRA Neck w/Cont (09.19.17 @ 21:29) >  IMPRESSION:    Normal study        Albumin, Serum: 4.7 g/dL (09-19 @ 17:18)    LIVER FUNCTIONS - ( 19 Sep 2017 17:18 )  Alb: 4.7 g/dL / Pro: 7.5 g/dL / ALK PHOS: 80 U/L / ALT: 13 U/L / AST: 18 U/L / GGT: x             RADIOLOGY & ADDITIONAL TESTS:
Patient is a 57y old  Male who presents with a chief complaint of blurry vision rt. (20 Sep 2017 10:06)    + optic neuritis noted at ophthalmologist     HEMATOLOGIC:  aspirin enteric coated 81 milliGRAM(s) Oral daily  enoxaparin Injectable 40 milliGRAM(s) SubCutaneous daily    ENDO/METABOLIC:  levothyroxine 75 MICROGram(s) Oral daily  atorvastatin 20 milliGRAM(s) Oral at bedtime        Allergies    No Known Allergies        Vital Signs Last 24 Hrs  T(C): 36.6 (21 Sep 2017 15:45), Max: 36.6 (21 Sep 2017 10:11)  T(F): 97.9 (21 Sep 2017 15:45), Max: 97.9 (21 Sep 2017 15:45)  HR: 62 (22 Sep 2017 05:55) (58 - 100)  BP: 124/70 (22 Sep 2017 05:55) (110/78 - 145/80)  BP(mean): --  RR: 18 (22 Sep 2017 05:55) (16 - 18)  SpO2: 96% (22 Sep 2017 05:55) (95% - 97%)          REVIEW OF SYSTEMS:    CONSTITUTIONAL: No fever, weight loss, or fatigue  EYES: No eye pain, visual disturbances, or discharge  ENMT:  No difficulty hearing, tinnitus, vertigo; No sinus or throat pain  NECK: No pain or stiffness  RESPIRATORY: No cough, wheezing, chills or hemoptysis; No shortness of breath  CARDIOVASCULAR: No chest pain, palpitations, dizziness, or leg swelling  GASTROINTESTINAL: No abdominal or epigastric pain. No nausea, vomiting, or hematemesis; No diarrhea or constipation. No melena or hematochezia.  GENITOURINARY: No dysuria, frequency, hematuria, or incontinence  NEUROLOGICAL: No headaches, memory loss, loss of strength, numbness, or tremors, loss of R peripheral vision   SKIN: No itching, burning, rashes, or lesions   LYMPH NODES: No enlarged glands  ENDOCRINE: No heat or cold intolerance; No hair loss  MUSCULOSKELETAL: No joint pain or swelling; No muscle, back, or extremity pain        PHYSICAL EXAM:    GENERAL: NAD, well-groomed, well-developed  HEAD:  Atraumatic, Normocephalic  EYES: EOMI, PERRLA, conjunctiva and sclera clear  ENMT: No tonsillar erythema, exudates, or enlargement; Moist mucous membranes, Good dentition, No lesions  NECK: Supple, No JVD, Normal thyroid  NERVOUS SYSTEM:  Alert & Oriented X3, Good concentration; Motor Strength 5/5 B/L upper and lower extremities; DTRs 2+ intact and symmetric, +loss of R peripheral vision   CHEST/LUNG: Clear to percussion bilaterally; No rales, rhonchi, wheezing, or rubs  HEART: Regular rate and rhythm; No murmurs, rubs, or gallops  ABDOMEN: Soft, Nontender, Nondistended; Bowel sounds present  EXTREMITIES:  2+ Peripheral Pulses, No clubbing, cyanosis, or edema  LYMPH: No lymphadenopathy noted        LABS:                        14.3   6.5   )-----------( 249      ( 19 Sep 2017 17:18 )             42.6     CBC Full  -  ( 19 Sep 2017 17:18 )  WBC Count : 6.5 K/uL  Hemoglobin : 14.3 g/dL  Hematocrit : 42.6 %  Platelet Count - Automated : 249 K/uL  Mean Cell Volume : 81.6 fl  Mean Cell Hemoglobin : 27.4 pg  Mean Cell Hemoglobin Concentration : 33.6 g/dL  Auto Neutrophil # : 4.3 K/uL  Auto Lymphocyte # : 1.5 K/uL  Auto Monocyte # : 0.6 K/uL  Auto Eosinophil # : 0.1 K/uL  Auto Basophil # : 0.0 K/uL  Auto Neutrophil % : 65.9 %  Auto Lymphocyte % : 23.3 %  Auto Monocyte % : 8.6 %  Auto Eosinophil % : 1.4 %  Auto Basophil % : 0.5 %    09-19    144  |  104  |  14.0  ----------------------------<  87  4.1   |  26.0  |  1.17    Ca    9.2      19 Sep 2017 17:18    TPro  7.5  /  Alb  4.7  /  TBili  0.8  /  DBili  x   /  AST  18  /  ALT  13  /  AlkPhos  80  09-19    PT/INR - ( 19 Sep 2017 17:18 )   PT: 11.5 sec;   INR: 1.04 ratio         PTT - ( 19 Sep 2017 17:18 )  PTT:30.6 sec       MRI Head w/wo Cont (09.19.17 @ 21:29) >  IMPRESSION:    Normal brain         MRA Neck w/Cont (09.19.17 @ 21:29) >  IMPRESSION:    Normal study

## 2017-09-22 NOTE — PROGRESS NOTE ADULT - PROBLEM SELECTOR PLAN 1
IV Solumederol 1g to be completed tomorrow with PPI and give prednione taper thereafter + PPI.  LP done.  F/u CSF/blood serologies.  Outpatient follow up at [facility] at [phone] or [phone] with [facility] clinic for further diagnostic and treatment regimen.  D/w pt who agrees with plan.
Neuro stable.  Outpt f/u with primary Optho and Neuro-optho.  Prednisone taper + PPI.  Outpatient follow up at [facility] at [phone] or [phone] with [facility] Dr De [facility] O at [facility].  D/w pt in great detail and will f/u further CSF/Blood serologies as outpt.  Reconsult PRN.
awaiting further tests today
awaiting further tests today  further wkup with neuro
d/c home with outpt f/u with neurology

## 2017-09-22 NOTE — PROGRESS NOTE ADULT - PROBLEM SELECTOR PROBLEM 1
Blurry vision, right eye

## 2017-09-23 LAB
AUTO DIFF PNL BLD: ABNORMAL
B BURGDOR C6 AB SER-ACNC: NEGATIVE — SIGNIFICANT CHANGE UP
B BURGDOR IGG+IGM SER-ACNC: 0.07 INDEX — SIGNIFICANT CHANGE UP (ref 0.01–0.89)
VDRL CSF-TITR: NEGATIVE — SIGNIFICANT CHANGE UP

## 2017-09-24 LAB
CULTURE RESULTS: SIGNIFICANT CHANGE UP
SPECIMEN SOURCE: SIGNIFICANT CHANGE UP

## 2017-09-25 LAB
B BURGDOR DNA SPEC QL NAA+PROBE: NEGATIVE — SIGNIFICANT CHANGE UP
TM INTERPRETATION: SIGNIFICANT CHANGE UP

## 2017-09-26 ENCOUNTER — APPOINTMENT (OUTPATIENT)
Dept: OPHTHALMOLOGY | Facility: CLINIC | Age: 57
End: 2017-09-26
Payer: COMMERCIAL

## 2017-09-26 PROBLEM — E78.5 HYPERLIPIDEMIA, UNSPECIFIED: Chronic | Status: ACTIVE | Noted: 2017-09-19

## 2017-09-26 PROBLEM — E03.9 HYPOTHYROIDISM, UNSPECIFIED: Chronic | Status: ACTIVE | Noted: 2017-09-19

## 2017-09-26 LAB
AQP4 H2O CHANNEL AB SERPL IA-ACNC: NEGATIVE — SIGNIFICANT CHANGE UP
MBP CSF-MCNC: < 2 MCG/L — SIGNIFICANT CHANGE UP

## 2017-09-26 PROCEDURE — 99204 OFFICE O/P NEW MOD 45 MIN: CPT

## 2017-09-26 PROCEDURE — 92133 CPTRZD OPH DX IMG PST SGM ON: CPT

## 2017-10-02 LAB
B BURGDOR AB CSF-ACNC: SIGNIFICANT CHANGE UP
LYME AB STONY BROOK: SIGNIFICANT CHANGE UP

## 2017-10-16 LAB
CULTURE RESULTS: SIGNIFICANT CHANGE UP
SPECIMEN SOURCE: SIGNIFICANT CHANGE UP

## 2017-11-01 ENCOUNTER — APPOINTMENT (OUTPATIENT)
Dept: GASTROENTEROLOGY | Facility: CLINIC | Age: 57
End: 2017-11-01
Payer: COMMERCIAL

## 2017-11-01 VITALS
HEART RATE: 68 BPM | SYSTOLIC BLOOD PRESSURE: 138 MMHG | RESPIRATION RATE: 16 BRPM | BODY MASS INDEX: 28.63 KG/M2 | DIASTOLIC BLOOD PRESSURE: 95 MMHG | HEIGHT: 70 IN | OXYGEN SATURATION: 98 % | WEIGHT: 200 LBS

## 2017-11-01 DIAGNOSIS — Z80.0 FAMILY HISTORY OF MALIGNANT NEOPLASM OF DIGESTIVE ORGANS: ICD-10-CM

## 2017-11-01 PROCEDURE — 82270 OCCULT BLOOD FECES: CPT

## 2017-11-01 PROCEDURE — 99203 OFFICE O/P NEW LOW 30 MIN: CPT

## 2017-11-01 RX ORDER — LEVOTHYROXINE SODIUM 75 UG/1
75 TABLET ORAL DAILY
Qty: 1 | Refills: 3 | Status: ACTIVE | COMMUNITY

## 2017-11-01 RX ORDER — PREDNISONE 10 MG/1
10 TABLET ORAL
Qty: 40 | Refills: 0 | Status: DISCONTINUED | COMMUNITY
Start: 2017-09-22 | End: 2017-11-01

## 2017-11-01 RX ORDER — ATORVASTATIN CALCIUM 20 MG/1
20 TABLET, FILM COATED ORAL
Qty: 1 | Refills: 3 | Status: ACTIVE | COMMUNITY

## 2017-11-11 LAB
CULTURE RESULTS: SIGNIFICANT CHANGE UP
SPECIMEN SOURCE: SIGNIFICANT CHANGE UP

## 2017-11-13 ENCOUNTER — APPOINTMENT (OUTPATIENT)
Dept: OPHTHALMOLOGY | Facility: CLINIC | Age: 57
End: 2017-11-13
Payer: COMMERCIAL

## 2017-11-13 PROCEDURE — 92083 EXTENDED VISUAL FIELD XM: CPT

## 2017-11-13 PROCEDURE — 92133 CPTRZD OPH DX IMG PST SGM ON: CPT

## 2017-11-13 PROCEDURE — 92012 INTRM OPH EXAM EST PATIENT: CPT

## 2017-12-04 ENCOUNTER — LABORATORY RESULT (OUTPATIENT)
Age: 57
End: 2017-12-04

## 2017-12-04 ENCOUNTER — APPOINTMENT (OUTPATIENT)
Dept: FAMILY MEDICINE | Facility: CLINIC | Age: 57
End: 2017-12-04
Payer: COMMERCIAL

## 2017-12-04 PROCEDURE — 36415 COLL VENOUS BLD VENIPUNCTURE: CPT

## 2017-12-12 ENCOUNTER — APPOINTMENT (OUTPATIENT)
Dept: FAMILY MEDICINE | Facility: CLINIC | Age: 57
End: 2017-12-12
Payer: COMMERCIAL

## 2017-12-12 VITALS
WEIGHT: 200 LBS | DIASTOLIC BLOOD PRESSURE: 95 MMHG | BODY MASS INDEX: 28.63 KG/M2 | HEIGHT: 70 IN | SYSTOLIC BLOOD PRESSURE: 130 MMHG

## 2017-12-12 DIAGNOSIS — Z90.49 ACQUIRED ABSENCE OF OTHER SPECIFIED PARTS OF DIGESTIVE TRACT: ICD-10-CM

## 2017-12-12 DIAGNOSIS — K57.20 DIVERTICULITIS OF LARGE INTESTINE WITH PERFORATION AND ABSCESS W/OUT BLEEDING: ICD-10-CM

## 2017-12-12 DIAGNOSIS — Z82.49 FAMILY HISTORY OF ISCHEMIC HEART DISEASE AND OTHER DISEASES OF THE CIRCULATORY SYSTEM: ICD-10-CM

## 2017-12-12 DIAGNOSIS — Z87.19 PERSONAL HISTORY OF OTHER DISEASES OF THE DIGESTIVE SYSTEM: ICD-10-CM

## 2017-12-12 PROCEDURE — G0008: CPT

## 2017-12-12 PROCEDURE — 90686 IIV4 VACC NO PRSV 0.5 ML IM: CPT

## 2017-12-12 PROCEDURE — 99214 OFFICE O/P EST MOD 30 MIN: CPT | Mod: 25

## 2017-12-28 ENCOUNTER — APPOINTMENT (OUTPATIENT)
Dept: MRI IMAGING | Facility: CLINIC | Age: 57
End: 2017-12-28
Payer: COMMERCIAL

## 2017-12-28 ENCOUNTER — OUTPATIENT (OUTPATIENT)
Dept: OUTPATIENT SERVICES | Facility: HOSPITAL | Age: 57
LOS: 1 days | End: 2017-12-28
Payer: COMMERCIAL

## 2017-12-28 DIAGNOSIS — Z90.49 ACQUIRED ABSENCE OF OTHER SPECIFIED PARTS OF DIGESTIVE TRACT: Chronic | ICD-10-CM

## 2017-12-28 DIAGNOSIS — Z00.8 ENCOUNTER FOR OTHER GENERAL EXAMINATION: ICD-10-CM

## 2017-12-28 PROCEDURE — 70553 MRI BRAIN STEM W/O & W/DYE: CPT | Mod: 26

## 2017-12-28 PROCEDURE — A9585: CPT

## 2017-12-28 PROCEDURE — 70553 MRI BRAIN STEM W/O & W/DYE: CPT

## 2018-01-24 ENCOUNTER — OUTPATIENT (OUTPATIENT)
Dept: OUTPATIENT SERVICES | Facility: HOSPITAL | Age: 58
LOS: 1 days | End: 2018-01-24
Payer: COMMERCIAL

## 2018-01-24 ENCOUNTER — APPOINTMENT (OUTPATIENT)
Dept: GASTROENTEROLOGY | Facility: GI CENTER | Age: 58
End: 2018-01-24
Payer: COMMERCIAL

## 2018-01-24 DIAGNOSIS — K57.30 DIVERTICULOSIS OF LARGE INTESTINE W/OUT PERFORATION OR ABSCESS W/OUT BLEEDING: ICD-10-CM

## 2018-01-24 DIAGNOSIS — Z90.49 ACQUIRED ABSENCE OF OTHER SPECIFIED PARTS OF DIGESTIVE TRACT: Chronic | ICD-10-CM

## 2018-01-24 DIAGNOSIS — Z12.11 ENCOUNTER FOR SCREENING FOR MALIGNANT NEOPLASM OF COLON: ICD-10-CM

## 2018-01-24 DIAGNOSIS — K64.8 OTHER HEMORRHOIDS: ICD-10-CM

## 2018-01-24 PROCEDURE — G0121: CPT

## 2018-01-24 PROCEDURE — 45378 DIAGNOSTIC COLONOSCOPY: CPT

## 2018-03-19 ENCOUNTER — APPOINTMENT (OUTPATIENT)
Dept: OPHTHALMOLOGY | Facility: CLINIC | Age: 58
End: 2018-03-19
Payer: COMMERCIAL

## 2018-03-19 PROCEDURE — 92083 EXTENDED VISUAL FIELD XM: CPT

## 2018-03-19 PROCEDURE — 92133 CPTRZD OPH DX IMG PST SGM ON: CPT

## 2018-03-19 PROCEDURE — 92012 INTRM OPH EXAM EST PATIENT: CPT

## 2018-07-10 ENCOUNTER — APPOINTMENT (OUTPATIENT)
Dept: FAMILY MEDICINE | Facility: CLINIC | Age: 58
End: 2018-07-10

## 2018-07-28 PROBLEM — Z80.0 FAMILY HISTORY OF COLON CANCER: Status: INACTIVE | Noted: 2017-11-01

## 2019-03-05 ENCOUNTER — TRANSCRIPTION ENCOUNTER (OUTPATIENT)
Age: 59
End: 2019-03-05

## 2019-07-09 ENCOUNTER — APPOINTMENT (OUTPATIENT)
Dept: RHEUMATOLOGY | Facility: CLINIC | Age: 59
End: 2019-07-09
Payer: COMMERCIAL

## 2019-07-09 ENCOUNTER — LABORATORY RESULT (OUTPATIENT)
Age: 59
End: 2019-07-09

## 2019-07-09 VITALS
SYSTOLIC BLOOD PRESSURE: 130 MMHG | WEIGHT: 195 LBS | OXYGEN SATURATION: 98 % | TEMPERATURE: 99.9 F | HEIGHT: 70 IN | DIASTOLIC BLOOD PRESSURE: 80 MMHG | HEART RATE: 62 BPM | BODY MASS INDEX: 27.92 KG/M2 | RESPIRATION RATE: 17 BRPM

## 2019-07-09 DIAGNOSIS — Z83.49 FAMILY HISTORY OF OTHER ENDOCRINE, NUTRITIONAL AND METABOLIC DISEASES: ICD-10-CM

## 2019-07-09 DIAGNOSIS — Z82.61 FAMILY HISTORY OF ARTHRITIS: ICD-10-CM

## 2019-07-09 PROCEDURE — 99245 OFF/OP CONSLTJ NEW/EST HI 55: CPT

## 2019-07-09 RX ORDER — SODIUM SULFATE, POTASSIUM SULFATE, MAGNESIUM SULFATE 17.5; 3.13; 1.6 G/ML; G/ML; G/ML
17.5-3.13-1.6 SOLUTION, CONCENTRATE ORAL
Qty: 1 | Refills: 0 | Status: COMPLETED | COMMUNITY
Start: 2017-11-01 | End: 2019-07-09

## 2019-07-09 NOTE — HISTORY OF PRESENT ILLNESS
[FreeTextEntry1] : 58 year old male with PMHx as listed below reports that about 2 years ago, he suddenly lost vision in his right eye over the course of 2-3 days. He reports that one day, he began to see dark spots, which then progressively increased over 2-3 days so that he could no longer see out of the eye.  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  He has also been following with neuro-ophthalmology.  Pt reports that he was also referred to a neurologist, who ordered some further w/u which revealed a positive NORI.  He denies any other complaints.\par No F/C, no unintentional weight loss, no night sweats, no oral ulcers, no rashes, no joint pains, no alopecia, no photosensitivity, no dry eyes/dry mouth, no Raynaud symptoms, no focal weakness, no dysphagia, no jaw claudication.\par  [Anorexia] : no anorexia [Weight Loss] : no weight loss [Malaise] : no malaise [Fever] : no fever [Chills] : no chills [Fatigue] : no fatigue [Malar Facial Rash] : no malar facial rash [Skin Lesions] : no lesions [Skin Nodules] : no skin nodules [Oral Ulcers] : no oral ulcers [Cough] : no cough [Dry Mouth] : no dry mouth [Dysphonia] : no dysphonia [Dysphagia] : no dysphagia [Shortness of Breath] : no shortness of breath [Chest Pain] : no chest pain [Arthralgias] : no arthralgias [Joint Swelling] : no joint swelling [Joint Warmth] : no joint warmth [Joint Deformity] : no joint deformity [Decreased ROM] : no decreased range of motion [Falls] : no falls [Difficulty Standing] : no difficulty standing [Difficulty Walking] : no difficulty walking [Dyspnea] : no dyspnea [Myalgias] : no myalgias [Muscle Weakness] : no muscle weakness [Muscle Spasms] : no muscle spasms [Muscle Cramping] : no muscle cramping [Visual Changes] : visual changes [Eye Pain] : no eye pain [Eye Redness] : no eye redness [Dry Eyes] : no dry eyes

## 2019-07-09 NOTE — CONSULT LETTER
[Consult Letter:] : I had the pleasure of evaluating your patient, [unfilled]. [Dear  ___] : Dear  [unfilled], [Please see my note below.] : Please see my note below. [Consult Closing:] : Thank you very much for allowing me to participate in the care of this patient.  If you have any questions, please do not hesitate to contact me. [Sincerely,] : Sincerely, [FreeTextEntry3] : Anoop Hernandez MD\par Rheumatology\par [facility]\par  of Medicine\par [employer] [facility] \par \par [address]\[place] [address]\par phone:  [phone]\par fax:      [phone]\par \par [address] [place] [address]\[place] [address]\par phone:  [phone]\par fax:      [phone]\par  [DrJake  ___] : Dr. REEDER

## 2019-07-09 NOTE — PHYSICAL EXAM
[General Appearance - Alert] : alert [General Appearance - In No Acute Distress] : in no acute distress [Sclera] : the sclera and conjunctiva were normal [Outer Ear] : the ears and nose were normal in appearance [Oropharynx] : the oropharynx was normal [Neck Appearance] : the appearance of the neck was normal [Neck Cervical Mass (___cm)] : no neck mass was observed [Jugular Venous Distention Increased] : there was no jugular-venous distention [Thyroid Diffuse Enlargement] : the thyroid was not enlarged [Thyroid Nodule] : there were no palpable thyroid nodules [Heart Rate And Rhythm] : heart rate was normal and rhythm regular [Auscultation Breath Sounds / Voice Sounds] : lungs were clear to auscultation bilaterally [Heart Sounds Gallop] : no gallops [Heart Sounds] : normal S1 and S2 [Murmurs] : no murmurs [Edema] : there was no peripheral edema [Heart Sounds Pericardial Friction Rub] : no pericardial rub [Abdomen Soft] : soft [Bowel Sounds] : normal bowel sounds [Abdomen Tenderness] : non-tender [Abdomen Mass (___ Cm)] : no abdominal mass palpated [Supraclavicular Lymph Nodes Enlarged Bilaterally] : supraclavicular [Cervical Lymph Nodes Enlarged Anterior Bilaterally] : anterior cervical [Cervical Lymph Nodes Enlarged Posterior Bilaterally] : posterior cervical [FreeTextEntry1] : No synovitis, full ROM in all joints\par  [No Spinal Tenderness] : no spinal tenderness [Skin Turgor] : normal skin turgor [Skin Color & Pigmentation] : normal skin color and pigmentation [] : no rash [No Focal Deficits] : no focal deficits [Oriented To Time, Place, And Person] : oriented to person, place, and time [Impaired Insight] : insight and judgment were intact [Affect] : the affect was normal

## 2019-07-09 NOTE — ASSESSMENT
[FreeTextEntry1] : 58 year old male with hx of sudden vision loss in his right eye was referred to rule out the possibility of an underlying autoimmune cause.  While he was previously found to have a positive NORI and borderline dsDNA, he does not exhibit any obvious signs/symptoms of SLE or any other connective tissue disorder.  Given his personal and family history of hypothyroidism, I suspect that the positive NORI may be related to autoimmune thyroiditis.  I have therefore ordered some more bloodwork, including serologies, as further workup.  He will follow up with me again in 2 weeks to review his results.\par \par

## 2019-07-10 LAB
APPEARANCE: CLEAR
BACTERIA: NEGATIVE
BILIRUBIN URINE: NEGATIVE
BLOOD URINE: NORMAL
C3 SERPL-MCNC: 117 MG/DL
C4 SERPL-MCNC: 26 MG/DL
COLOR: YELLOW
CREAT SPEC-SCNC: 202 MG/DL
CREAT/PROT UR: 0.1 RATIO
CRP SERPL-MCNC: <0.1 MG/DL
ERYTHROCYTE [SEDIMENTATION RATE] IN BLOOD BY WESTERGREN METHOD: 2 MM/HR
GLUCOSE QUALITATIVE U: NEGATIVE
HYALINE CASTS: 1 /LPF
KETONES URINE: NEGATIVE
LEUKOCYTE ESTERASE URINE: NEGATIVE
MICROSCOPIC-UA: NORMAL
NITRITE URINE: NEGATIVE
PH URINE: 5.5
PROT UR-MCNC: 12 MG/DL
PROTEIN URINE: NEGATIVE
RED BLOOD CELLS URINE: 1 /HPF
SPECIFIC GRAVITY URINE: 1.03
SQUAMOUS EPITHELIAL CELLS: 0 /HPF
THYROGLOB AB SERPL-ACNC: 206 IU/ML
THYROPEROXIDASE AB SERPL IA-ACNC: 239 IU/ML
UROBILINOGEN URINE: NORMAL
WHITE BLOOD CELLS URINE: 0 /HPF

## 2019-07-11 LAB
DSDNA AB SER-ACNC: <12 IU/ML
ENA RNP AB SER IA-ACNC: <0.2 AL
ENA SM AB SER IA-ACNC: <0.2 AL
ENA SS-A AB SER IA-ACNC: <0.2 AL
ENA SS-B AB SER IA-ACNC: <0.2 AL

## 2019-07-12 LAB
B2 GLYCOPROT1 IGG SER-ACNC: <5 SGU
B2 GLYCOPROT1 IGM SER-ACNC: <5 SMU
CARDIOLIPIN IGM SER-MCNC: <5 GPL
CARDIOLIPIN IGM SER-MCNC: <5 MPL

## 2019-07-22 ENCOUNTER — APPOINTMENT (OUTPATIENT)
Dept: RHEUMATOLOGY | Facility: CLINIC | Age: 59
End: 2019-07-22
Payer: COMMERCIAL

## 2019-07-22 VITALS
HEART RATE: 67 BPM | HEIGHT: 70 IN | TEMPERATURE: 99.6 F | RESPIRATION RATE: 17 BRPM | BODY MASS INDEX: 27.92 KG/M2 | DIASTOLIC BLOOD PRESSURE: 80 MMHG | SYSTOLIC BLOOD PRESSURE: 138 MMHG | OXYGEN SATURATION: 97 % | WEIGHT: 195 LBS

## 2019-07-22 DIAGNOSIS — H54.61 UNQUALIFIED VISUAL LOSS, RIGHT EYE, NORMAL VISION LEFT EYE: ICD-10-CM

## 2019-07-22 DIAGNOSIS — R76.8 OTHER SPECIFIED ABNORMAL IMMUNOLOGICAL FINDINGS IN SERUM: ICD-10-CM

## 2019-07-22 DIAGNOSIS — E03.9 HYPOTHYROIDISM, UNSPECIFIED: ICD-10-CM

## 2019-07-22 PROCEDURE — 99214 OFFICE O/P EST MOD 30 MIN: CPT

## 2019-07-22 NOTE — HISTORY OF PRESENT ILLNESS
[Visual Changes] : visual changes [FreeTextEntry1] : Feeling "the same" since last visit.  No changes in his vision.  No new complaints. [Anorexia] : no anorexia [Weight Loss] : no weight loss [Malaise] : no malaise [Fever] : no fever [Chills] : no chills [Fatigue] : no fatigue [Malar Facial Rash] : no malar facial rash [Skin Lesions] : no lesions [Skin Nodules] : no skin nodules [Oral Ulcers] : no oral ulcers [Cough] : no cough [Dry Mouth] : no dry mouth [Dysphonia] : no dysphonia [Dysphagia] : no dysphagia [Shortness of Breath] : no shortness of breath [Chest Pain] : no chest pain [Arthralgias] : no arthralgias [Joint Swelling] : no joint swelling [Joint Warmth] : no joint warmth [Joint Deformity] : no joint deformity [Decreased ROM] : no decreased range of motion [Falls] : no falls [Difficulty Standing] : no difficulty standing [Difficulty Walking] : no difficulty walking [Dyspnea] : no dyspnea [Myalgias] : no myalgias [Muscle Weakness] : no muscle weakness [Muscle Spasms] : no muscle spasms [Muscle Cramping] : no muscle cramping [Eye Pain] : no eye pain [Eye Redness] : no eye redness [Dry Eyes] : no dry eyes

## 2019-07-22 NOTE — PHYSICAL EXAM
[General Appearance - Alert] : alert [General Appearance - In No Acute Distress] : in no acute distress [Sclera] : the sclera and conjunctiva were normal [Outer Ear] : the ears and nose were normal in appearance [Oropharynx] : the oropharynx was normal [Neck Appearance] : the appearance of the neck was normal [Neck Cervical Mass (___cm)] : no neck mass was observed [Jugular Venous Distention Increased] : there was no jugular-venous distention [Thyroid Diffuse Enlargement] : the thyroid was not enlarged [Thyroid Nodule] : there were no palpable thyroid nodules [Auscultation Breath Sounds / Voice Sounds] : lungs were clear to auscultation bilaterally [Heart Rate And Rhythm] : heart rate was normal and rhythm regular [Heart Sounds Gallop] : no gallops [Murmurs] : no murmurs [Heart Sounds] : normal S1 and S2 [Heart Sounds Pericardial Friction Rub] : no pericardial rub [Edema] : there was no peripheral edema [Abdomen Soft] : soft [Bowel Sounds] : normal bowel sounds [Abdomen Tenderness] : non-tender [Abdomen Mass (___ Cm)] : no abdominal mass palpated [Cervical Lymph Nodes Enlarged Posterior Bilaterally] : posterior cervical [Cervical Lymph Nodes Enlarged Anterior Bilaterally] : anterior cervical [Supraclavicular Lymph Nodes Enlarged Bilaterally] : supraclavicular [No Spinal Tenderness] : no spinal tenderness [Skin Turgor] : normal skin turgor [Skin Color & Pigmentation] : normal skin color and pigmentation [] : no rash [Oriented To Time, Place, And Person] : oriented to person, place, and time [No Focal Deficits] : no focal deficits [Impaired Insight] : insight and judgment were intact [Affect] : the affect was normal [FreeTextEntry1] : No synovitis, full ROM in all joints\par

## 2019-07-22 NOTE — ASSESSMENT
[FreeTextEntry1] : 58 year old male with:\par 1)  Hx of sudden vision loss in his right eye: Per ophthalmology, most c/w NAION.  No obvious si/sx of an underlying CTD.  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

## 2019-09-09 ENCOUNTER — NON-APPOINTMENT (OUTPATIENT)
Age: 59
End: 2019-09-09

## 2019-09-09 ENCOUNTER — APPOINTMENT (OUTPATIENT)
Dept: CARDIOLOGY | Facility: CLINIC | Age: 59
End: 2019-09-09
Payer: COMMERCIAL

## 2019-09-09 ENCOUNTER — RECORD ABSTRACTING (OUTPATIENT)
Age: 59
End: 2019-09-09

## 2019-09-09 VITALS
OXYGEN SATURATION: 97 % | HEIGHT: 70 IN | WEIGHT: 191 LBS | BODY MASS INDEX: 27.35 KG/M2 | DIASTOLIC BLOOD PRESSURE: 94 MMHG | HEART RATE: 59 BPM | SYSTOLIC BLOOD PRESSURE: 159 MMHG | RESPIRATION RATE: 17 BRPM

## 2019-09-09 DIAGNOSIS — R00.2 PALPITATIONS: ICD-10-CM

## 2019-09-09 PROCEDURE — 93000 ELECTROCARDIOGRAM COMPLETE: CPT

## 2019-09-09 PROCEDURE — 99244 OFF/OP CNSLTJ NEW/EST MOD 40: CPT

## 2019-09-09 NOTE — HISTORY OF PRESENT ILLNESS
[FreeTextEntry1] : Patient is a 60yo M with a family h/o heart disease, HLD, ischemic optic neuropathy here for cardiac evaluation of palpitations. Patient denies PND/orthopnea/edema/syncope/claudication Patient has been doing well without any chest pain or shortness of breath. 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 \par Still with essentially no vision in right eye since event last year. Has intermittent rare palpitations. Seems random, possibly when nervous. Brief episodes, lasts seconds. Occasional exercise but nothing regular. \par \par ROS: GI negative, all others negative

## 2019-09-09 NOTE — PHYSICAL EXAM
[General Appearance - Well Developed] : well developed [General Appearance - Well Nourished] : well nourished [Normal Conjunctiva] : the conjunctiva exhibited no abnormalities [Normal Oropharynx] : normal oropharynx [Normal Jugular Venous V Waves Present] : normal jugular venous V waves present [Heart Rate And Rhythm] : heart rate and rhythm were normal [Heart Sounds] : normal S1 and S2 [Murmurs] : no murmurs present [] : no respiratory distress [Respiration, Rhythm And Depth] : normal respiratory rhythm and effort [Auscultation Breath Sounds / Voice Sounds] : lungs were clear to auscultation bilaterally [Abdomen Soft] : soft [Bowel Sounds] : normal bowel sounds [Abdomen Tenderness] : non-tender [Abnormal Walk] : normal gait [Cyanosis, Localized] : no localized cyanosis [Nail Clubbing] : no clubbing of the fingernails [Skin Color & Pigmentation] : normal skin color and pigmentation [Skin Turgor] : normal skin turgor [Oriented To Time, Place, And Person] : oriented to person, place, and time [Affect] : the affect was normal [FreeTextEntry1] : no edema

## 2019-09-09 NOTE — ASSESSMENT
[FreeTextEntry1] : ECG: SR, no significant ST-T abnormalities and normal intervals \par \par Renal Artery Duplex: Negative for stenosis (7/2019)\par Carotid Duplex: No plaque/stenosis (7/2019)\par TCD: No Embolic activity, findings suggestive of right proximal MCA\par MRA Brain/Carotids: Unremarkable (8/2019)\par MRI Brain 2018: Small vessel ischemic disease

## 2019-09-09 NOTE — DISCUSSION/SUMMARY
[FreeTextEntry1] : Patient is a 60yo M with a family h/o heart disease, HLD, ischemic optic neuropathy here for cardiac evaluation of palpitations. Palps seem few and far between, possilby related to anxiety or rare ectopy. I am concerned about his CV risk given family history, HLD and ischemic optic neuropathy with intracranial small vessel disease. Will arrange echo and nuclear stress testing for further evaluation. \par \par 1. Echo and nuclear stress test for reasons noted above\par 2. Continue ASA/statin\par 3. RE-eval BP at follow up and during stress testing, high today but appears anxious. Will likely need BP meds\par 4. Recommend aggressive diet and lifestyle modifications \par 5. Will obtain BW to review\par 6. Follow up after testing

## 2019-09-24 ENCOUNTER — APPOINTMENT (OUTPATIENT)
Dept: CARDIOLOGY | Facility: CLINIC | Age: 59
End: 2019-09-24
Payer: COMMERCIAL

## 2019-09-24 PROCEDURE — A9500: CPT

## 2019-09-24 PROCEDURE — 93015 CV STRESS TEST SUPVJ I&R: CPT

## 2019-09-24 PROCEDURE — 78452 HT MUSCLE IMAGE SPECT MULT: CPT

## 2019-09-26 RX ORDER — KIT FOR THE PREPARATION OF TECHNETIUM TC99M SESTAMIBI 1 MG/5ML
INJECTION, POWDER, LYOPHILIZED, FOR SOLUTION PARENTERAL
Refills: 0 | Status: COMPLETED | OUTPATIENT
Start: 2019-09-26

## 2019-09-26 RX ADMIN — KIT FOR THE PREPARATION OF TECHNETIUM TC99M SESTAMIBI 0: 1 INJECTION, POWDER, LYOPHILIZED, FOR SOLUTION PARENTERAL at 00:00

## 2019-10-01 ENCOUNTER — APPOINTMENT (OUTPATIENT)
Dept: CARDIOLOGY | Facility: CLINIC | Age: 59
End: 2019-10-01
Payer: COMMERCIAL

## 2019-10-01 PROCEDURE — 93306 TTE W/DOPPLER COMPLETE: CPT

## 2019-11-19 ENCOUNTER — NON-APPOINTMENT (OUTPATIENT)
Age: 59
End: 2019-11-19

## 2019-11-19 ENCOUNTER — APPOINTMENT (OUTPATIENT)
Dept: CARDIOLOGY | Facility: CLINIC | Age: 59
End: 2019-11-19
Payer: COMMERCIAL

## 2019-11-19 VITALS
WEIGHT: 201 LBS | HEIGHT: 70 IN | BODY MASS INDEX: 28.77 KG/M2 | RESPIRATION RATE: 16 BRPM | HEART RATE: 53 BPM | SYSTOLIC BLOOD PRESSURE: 153 MMHG | DIASTOLIC BLOOD PRESSURE: 95 MMHG

## 2019-11-19 PROCEDURE — 93000 ELECTROCARDIOGRAM COMPLETE: CPT

## 2019-11-19 PROCEDURE — 99214 OFFICE O/P EST MOD 30 MIN: CPT

## 2019-11-19 NOTE — PHYSICAL EXAM
[General Appearance - Well Developed] : well developed [General Appearance - Well Nourished] : well nourished [Normal Conjunctiva] : the conjunctiva exhibited no abnormalities [Normal Oropharynx] : normal oropharynx [Normal Jugular Venous V Waves Present] : normal jugular venous V waves present [] : no respiratory distress [Respiration, Rhythm And Depth] : normal respiratory rhythm and effort [Auscultation Breath Sounds / Voice Sounds] : lungs were clear to auscultation bilaterally [Heart Rate And Rhythm] : heart rate and rhythm were normal [Murmurs] : no murmurs present [Heart Sounds] : normal S1 and S2 [Bowel Sounds] : normal bowel sounds [Abdomen Soft] : soft [Abdomen Tenderness] : non-tender [Nail Clubbing] : no clubbing of the fingernails [Abnormal Walk] : normal gait [Cyanosis, Localized] : no localized cyanosis [Skin Color & Pigmentation] : normal skin color and pigmentation [Oriented To Time, Place, And Person] : oriented to person, place, and time [Skin Turgor] : normal skin turgor [Affect] : the affect was normal [FreeTextEntry1] : no edema

## 2019-11-19 NOTE — HISTORY OF PRESENT ILLNESS
[FreeTextEntry1] : Patient is a 60yo M with a family h/o heart disease, HLD, ischemic optic neuropathy here for cardiac evaluation of palpitations. Patient denies PND/orthopnea/edema/syncope/claudication Patient has been doing well without any chest pain or shortness of breath. 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 \par Still with essentially no vision in right eye since event last year. Has intermittent rare palpitations. Seems random, possibly when nervous. Brief episodes, lasts seconds. Occasional exercise but nothing regular. Patient underwent cardiac testing after last visit. \par \par ROS: GI and  negative

## 2019-11-19 NOTE — ASSESSMENT
[FreeTextEntry1] : ECG: SR, no significant ST-T abnormalities and normal intervals \par \par ECHO 10/2019:\par 1. Normal LV size, systolic and diastolic function. EF 55-60%\par 2. Normal RV/LA/RA \par 3. Mild MR/TR\par \par EXERCISE NUCLEAR STRESS TEST 9/2019:\par 1. Negative for ischemia with significant artifact\par 2. EF 66%\par 3. Achieved 10min 30 seconds and 11 METS\par 4. NOrmal HR/BP response \par \par Renal Artery Duplex: Negative for stenosis (7/2019)\par Carotid Duplex: No plaque/stenosis (7/2019)\par TCD: No Embolic activity, findings suggestive of right proximal MCA\par MRA Brain/Carotids: Unremarkable (8/2019)\par MRI Brain 2018: Small vessel ischemic disease

## 2019-11-19 NOTE — DISCUSSION/SUMMARY
[FreeTextEntry1] : Patient is a 60yo M with a family h/o heart disease, HLD, ischemic optic neuropathy here for cardiac evaluation of palpitations. Palps seem few and far between, possilby related to anxiety or rare ectopy. Stress test without ischemia and good functional capacity. Echo shows normal biventricular function and no clinically significant valvular abnormalities. Mild MR/TR need surveillance only and not significant at this time. BP high but has gained weight. FLuctuates some as well. Will try diet/lifestyle modifications prior to meds. He will follow up with PMD to re-evaluate\par \par 1. Recommend 30 minutes moderate intensity aerobic activity 5 days per week \par 2. Continue ASA/statin\par 3. Consider repeat echo 2-3 years for MR\par 4. Recommend aggressive diet and lifestyle modifications. Counselled on diet/weight loss. If BP remains high with lifestyle changes and weight loss will need  medications\par 5. Follow up 1 year

## 2020-10-16 ENCOUNTER — NON-APPOINTMENT (OUTPATIENT)
Age: 60
End: 2020-10-16

## 2020-11-10 ENCOUNTER — NON-APPOINTMENT (OUTPATIENT)
Age: 60
End: 2020-11-10

## 2020-11-10 ENCOUNTER — APPOINTMENT (OUTPATIENT)
Dept: CARDIOLOGY | Facility: CLINIC | Age: 60
End: 2020-11-10
Payer: COMMERCIAL

## 2020-11-10 VITALS
TEMPERATURE: 97.8 F | HEIGHT: 70 IN | RESPIRATION RATE: 16 BRPM | OXYGEN SATURATION: 97 % | BODY MASS INDEX: 26.48 KG/M2 | HEART RATE: 80 BPM | DIASTOLIC BLOOD PRESSURE: 93 MMHG | SYSTOLIC BLOOD PRESSURE: 148 MMHG | WEIGHT: 185 LBS

## 2020-11-10 DIAGNOSIS — R94.31 ABNORMAL ELECTROCARDIOGRAM [ECG] [EKG]: ICD-10-CM

## 2020-11-10 PROCEDURE — 99072 ADDL SUPL MATRL&STAF TM PHE: CPT

## 2020-11-10 PROCEDURE — 93000 ELECTROCARDIOGRAM COMPLETE: CPT

## 2020-11-10 PROCEDURE — 99214 OFFICE O/P EST MOD 30 MIN: CPT

## 2020-11-10 NOTE — DISCUSSION/SUMMARY
[FreeTextEntry1] : Patient is a 61yo M with a family h/o heart disease, HLD, ischemic optic neuropathy here for cardiac follow up. TEsting last year mostly unremarkable. Stress test without ischemia and good functional capacity. Echo shows normal biventricular function and no clinically significant valvular abnormalities. Mild MR/TR need surveillance only. BP remains borderline. \par \par 1. Recommend 30 minutes moderate intensity aerobic activity 5 days per week \par 2. Continue ASA/statin\par 3. Consider repeat echo 2-3 years for MR\par 4. Recommend aggressive diet and lifestyle modifications. Counselled on diet/weight loss. If BP remains high with lifestyle changes and weight loss will need  medications\par 5. Follow up 1 year\par 6. BP borderline, did not tolerate meds, likely white coat HTN. AT some point expect will need meds but not yet

## 2020-11-10 NOTE — HISTORY OF PRESENT ILLNESS
[FreeTextEntry1] : Patient is a 59yo M with a family h/o heart disease, HLD, ischemic optic neuropathy here for cardiac follow up. Patient denies PND/orthopnea/edema/syncope/claudication Patient has been doing well without any chest pain or shortness of breath.  Still with essentially no vision in right eye since event last year. \par PMD started on valsartan back in the spring, took for a couple weeks and could not tolerated. BP dropped low and couldn’t function he states. Was tired/exhausted and stopped. BP at PMD recently 120s systolic. At home runs 130s. 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 \par \par ROS: GI and  negative

## 2021-11-04 ENCOUNTER — APPOINTMENT (OUTPATIENT)
Dept: CARDIOLOGY | Facility: CLINIC | Age: 61
End: 2021-11-04
Payer: COMMERCIAL

## 2021-11-04 VITALS
HEIGHT: 70 IN | WEIGHT: 198 LBS | DIASTOLIC BLOOD PRESSURE: 90 MMHG | RESPIRATION RATE: 16 BRPM | BODY MASS INDEX: 28.35 KG/M2 | HEART RATE: 59 BPM | SYSTOLIC BLOOD PRESSURE: 152 MMHG

## 2021-11-04 DIAGNOSIS — H47.011 ISCHEMIC OPTIC NEUROPATHY, RIGHT EYE: ICD-10-CM

## 2021-11-04 PROCEDURE — 99214 OFFICE O/P EST MOD 30 MIN: CPT

## 2021-11-04 PROCEDURE — 93000 ELECTROCARDIOGRAM COMPLETE: CPT

## 2021-11-04 NOTE — DISCUSSION/SUMMARY
[FreeTextEntry1] : Patient is a 62yo M with a family h/o heart disease, HLD, ischemic optic neuropathy \par -Testing 2019 mostly unremarkable. Stress test without ischemia and good functional capacity. \par -Echo 2021 outside facility unremarkable, carotid normal as well \par -BP remains above goal, wants to try diet/lifestyle modifications prior to meds. Home BP 130s. \par -ECG and exam other than high BP normal today \par \par 1. Recommend 30 minutes moderate intensity aerobic activity 5 days per week \par 2. Continue ASA/statin\par 3. If BP not at goal at follow up with PMD i January, recommend starting meds. Lisinopril 5mg daily. Did not tolerate valsartan in past and would start low. \par 4. Recommend aggressive diet and lifestyle modifications. Needs to cut back simple carbs/sugars\par 5. Annual follow up

## 2021-11-04 NOTE — ASSESSMENT
[FreeTextEntry1] : ECG: SR, no significant ST-T abnormalities and normal intervals \par \par ECHO ([facility]) 7/20201:\par 1. Normal LV size and function\par 2. Impaired relaxation\par 3. Mild TR \par \par CAROTID  ([facility]) 7/20201:\par 1. Normal study\par 2. Antegrade flow in the vertebral arteries bilaterally \par \par Renal Duple ([facility]) 7/20201:\par 1, No evidence JANIE \par \par TCD ([facility]) 7/20201:\par 1. Early microvascular disease \par \par ECHO 10/2019:\par 1. Normal LV size, systolic and diastolic function. EF 55-60%\par 2. Normal RV/LA/RA \par 3. Mild MR/TR\par \par EXERCISE NUCLEAR STRESS TEST 9/2019:\par 1. Negative for ischemia with significant artifact\par 2. EF 66%\par 3. Achieved 10min 30 seconds and 11 METS\par 4. NOrmal HR/BP response \par \par Renal Artery Duplex: Negative for stenosis (7/2019)\par Carotid Duplex: No plaque/stenosis (7/2019)\par TCD: No Embolic activity, findings suggestive of right proximal MCA\par MRA Brain/Carotids: Unremarkable (8/2019)\par MRI Brain 2018: Small vessel ischemic disease

## 2021-11-04 NOTE — HISTORY OF PRESENT ILLNESS
[FreeTextEntry1] : Patient is a 62yo M with a family h/o heart disease, HLD, ischemic optic neuropathy here for cardiac follow up. Patient denies PND/orthopnea/edema/syncope/claudication Patient has been doing well without any chest pain or shortness of breath.  Still with essentially no vision in right eye since event 2019. HAs been vaccinated for flu and COVID. BP at PMD was 130/90, similar at home as well. HAd bad reaction on Valsartan in past, could not tolerate due to drop in BP. 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 \par \par ROS: GI and  negative

## 2022-11-10 ENCOUNTER — APPOINTMENT (OUTPATIENT)
Dept: CARDIOLOGY | Facility: CLINIC | Age: 62
End: 2022-11-10

## 2022-11-10 ENCOUNTER — NON-APPOINTMENT (OUTPATIENT)
Age: 62
End: 2022-11-10

## 2022-11-10 VITALS
BODY MASS INDEX: 27.06 KG/M2 | WEIGHT: 189 LBS | HEART RATE: 55 BPM | SYSTOLIC BLOOD PRESSURE: 150 MMHG | OXYGEN SATURATION: 100 % | RESPIRATION RATE: 14 BRPM | DIASTOLIC BLOOD PRESSURE: 98 MMHG | HEIGHT: 70 IN

## 2022-11-10 DIAGNOSIS — I10 ESSENTIAL (PRIMARY) HYPERTENSION: ICD-10-CM

## 2022-11-10 DIAGNOSIS — E78.00 PURE HYPERCHOLESTEROLEMIA, UNSPECIFIED: ICD-10-CM

## 2022-11-10 DIAGNOSIS — R03.0 ELEVATED BLOOD-PRESSURE READING, W/OUT DIAGNOSIS OF HYPERTENSION: ICD-10-CM

## 2022-11-10 DIAGNOSIS — Z82.49 FAMILY HISTORY OF ISCHEMIC HEART DISEASE AND OTHER DISEASES OF THE CIRCULATORY SYSTEM: ICD-10-CM

## 2022-11-10 PROCEDURE — 93000 ELECTROCARDIOGRAM COMPLETE: CPT

## 2022-11-10 PROCEDURE — 99214 OFFICE O/P EST MOD 30 MIN: CPT | Mod: 25

## 2022-11-10 NOTE — HISTORY OF PRESENT ILLNESS
[FreeTextEntry1] : Patient is a 61yo M with a family h/o heart disease, HLD, ischemic optic neuropathy here for cardiac follow up. Patient denies PND/orthopnea/edema/syncope/claudication Patient has been doing well without any chest pain or shortness of breath.  Still with essentially no vision in right eye since event 2019. 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 \par \par ROS: GI and  negative

## 2022-11-10 NOTE — DISCUSSION/SUMMARY
[EKG obtained to assist in diagnosis and management of assessed problem(s)] : EKG obtained to assist in diagnosis and management of assessed problem(s) [FreeTextEntry1] : Patient is a 61yo M with a family h/o heart disease, HLD, ischemic optic neuropathy here for follow up\par -Testing 2019 mostly unremarkable. Stress test without ischemia and good functional capacity. \par -Echo 2021 outside facility unremarkable, carotid normal as well \par -BP remains above goal, wants to try diet/lifestyle modifications prior to meds. \par -ECG and exam other than high BP normal today / Home BP lower and at other MD visists, all 130/80 or less. HAd been on trial of Valsartan and dropped BP too low/did not tolerated\par \par 1. BP well controlled, mild white coat HTN  does not tolerate BP medication in past\par 2. Continue ASA/statin\par 3. Recommend 30 minutes moderate intensity aerobic activity 5 days per week \par 4. Recommend aggressive diet and lifestyle modifications. Needs to cut back simple carbs/sugars\par 5. Annual follow up

## 2022-11-10 NOTE — ASSESSMENT
[FreeTextEntry1] : ECG: SR, no significant ST-T abnormalities and normal intervals \par \par  HDL 55 LDL 83 TG 97 (10//2022)  \par \par ECHO ([facility]) 8/20202:\par 1. NOrmal LV size and function, EF 60-65%\par 2. Grade I diastolic dysfunction \par 3. GLS -15.7, RVSP 23mmHg\par \par ECHO ([facility]) 7/20201:\par 1. Normal LV size and function\par 2. Impaired relaxation\par 3. Mild TR \par \par CAROTID  ([facility]) 7/20201:\par 1. Normal study\par 2. Antegrade flow in the vertebral arteries bilaterally \par \par Renal Duple ([facility]) 7/20201:\par 1, No evidence JANIE \par \par TCD ([facility]) 7/20201:\par 1. Early microvascular disease \par \par ECHO 10/2019:\par 1. Normal LV size, systolic and diastolic function. EF 55-60%\par 2. Normal RV/LA/RA \par 3. Mild MR/TR\par \par EXERCISE NUCLEAR STRESS TEST 9/2019:\par 1. Negative for ischemia with significant artifact\par 2. EF 66%\par 3. Achieved 10min 30 seconds and 11 METS\par 4. NOrmal HR/BP response \par \par Renal Artery Duplex: Negative for stenosis (7/2019)\par Carotid Duplex: No plaque/stenosis (7/2019)\par TCD: No Embolic activity, findings suggestive of right proximal MCA\par MRA Brain/Carotids: Unremarkable (8/2019)\par MRI Brain 2018: Small vessel ischemic disease

## 2023-06-26 NOTE — ED ADULT NURSE NOTE - TEMPLATE
Discharge instructions given to father. Educated on follow-up with PMD/ortho. Father denies questions/concerns.    Neuro

## 2023-08-11 LAB — A1CG - A1C WITH ESTIMATED AVERAGE GLUCOSE: 5.4
